# Patient Record
Sex: MALE | Race: WHITE | Employment: FULL TIME | URBAN - METROPOLITAN AREA
[De-identification: names, ages, dates, MRNs, and addresses within clinical notes are randomized per-mention and may not be internally consistent; named-entity substitution may affect disease eponyms.]

---

## 2017-03-23 ENCOUNTER — OFFICE VISIT (OUTPATIENT)
Dept: OTHER | Facility: HOSPITAL | Age: 57
End: 2017-03-23
Attending: PREVENTIVE MEDICINE

## 2017-03-23 ENCOUNTER — OFFICE VISIT (OUTPATIENT)
Dept: OTHER | Facility: HOSPITAL | Age: 57
End: 2017-03-23

## 2017-03-23 ENCOUNTER — HOSPITAL ENCOUNTER (OUTPATIENT)
Dept: CV DIAGNOSTICS | Facility: HOSPITAL | Age: 57
Discharge: HOME | End: 2017-03-23
Attending: PREVENTIVE MEDICINE

## 2017-03-23 DIAGNOSIS — Z00.00 PHYSICAL EXAM, ANNUAL: ICD-10-CM

## 2017-03-23 DIAGNOSIS — Z00.00 PHYSICAL EXAM, ANNUAL: Primary | ICD-10-CM

## 2017-03-24 NOTE — H&P
PATIENT'S NAME: Lisette Sloan   ATTENDING PHYSICIAN: Alondra Miles M.D.    PATIENT ACCOUNT #: [de-identified] LOCATION: [address] [place] [address] RECORD #: [id number] YOB: 1960   DATE OF SERVICE: 03/23/2017       EXECUTIVE HISTORY AND Labs show a negative heavy metal screen and elevated lipid panel. Cholesterol is 220 with an HDL of 34 and LDL of 114. Triglycerides were 359. There was some mild liver enzyme elevation with an AST of 93 and ALT of 85.   Alkaline phosphatase was norm

## 2017-06-27 RX ORDER — OLMESARTAN MEDOXOMIL 20 MG/1
TABLET, FILM COATED ORAL
Qty: 90 TABLET | Refills: 3 | Status: SHIPPED | OUTPATIENT
Start: 2017-06-27 | End: 2018-01-02

## 2017-06-27 NOTE — TELEPHONE ENCOUNTER
Hypertension Medications Protocol Failed6/25 1:02 AM   CMP or BMP in past 12 months     Requesting Benicar  LOV: 12/12/16 -acute  10/13/16 - wellness   RTC: 1 year   Last Labs: 2/2016 per media  Filled: 5/13/16 #90 with 3 refills    No future appointments.

## 2017-07-01 ENCOUNTER — MED REC SCAN ONLY (OUTPATIENT)
Dept: FAMILY MEDICINE CLINIC | Facility: CLINIC | Age: 57
End: 2017-07-01

## 2017-11-01 ENCOUNTER — TELEPHONE (OUTPATIENT)
Dept: FAMILY MEDICINE CLINIC | Facility: CLINIC | Age: 57
End: 2017-11-01

## 2017-11-01 DIAGNOSIS — Z12.5 ENCOUNTER FOR SPECIAL SCREENING EXAMINATION FOR NEOPLASM OF PROSTATE: ICD-10-CM

## 2017-11-01 DIAGNOSIS — E78.2 MIXED HYPERLIPIDEMIA: ICD-10-CM

## 2017-11-01 DIAGNOSIS — E88.81 METABOLIC SYNDROME: ICD-10-CM

## 2017-11-01 DIAGNOSIS — I10 ESSENTIAL HYPERTENSION: Primary | ICD-10-CM

## 2017-11-01 DIAGNOSIS — N52.9 ERECTILE DYSFUNCTION, UNSPECIFIED ERECTILE DYSFUNCTION TYPE: ICD-10-CM

## 2017-11-01 NOTE — TELEPHONE ENCOUNTER
Requesting lab order  LOV: 12/12/16  RTC: not noted  Last Labs: 9/2016 and 10/2016    Time started: 1006  Time ended: 1008  Total time spent on chart: 2 min    Future Appointments  Date Time Provider Buster Elmore   12/11/2017 3:00 PM Sabino Jeffers MD

## 2017-12-05 ENCOUNTER — LAB ENCOUNTER (OUTPATIENT)
Dept: LAB | Age: 57
End: 2017-12-05
Attending: INTERNAL MEDICINE
Payer: COMMERCIAL

## 2017-12-05 DIAGNOSIS — E78.2 MIXED HYPERLIPIDEMIA: ICD-10-CM

## 2017-12-05 DIAGNOSIS — Z12.5 ENCOUNTER FOR SPECIAL SCREENING EXAMINATION FOR NEOPLASM OF PROSTATE: ICD-10-CM

## 2017-12-05 DIAGNOSIS — E88.81 METABOLIC SYNDROME: ICD-10-CM

## 2017-12-05 DIAGNOSIS — I10 ESSENTIAL HYPERTENSION: ICD-10-CM

## 2017-12-05 DIAGNOSIS — N52.9 ERECTILE DYSFUNCTION, UNSPECIFIED ERECTILE DYSFUNCTION TYPE: ICD-10-CM

## 2017-12-05 PROCEDURE — 80061 LIPID PANEL: CPT | Performed by: INTERNAL MEDICINE

## 2017-12-05 PROCEDURE — 84153 ASSAY OF PSA TOTAL: CPT | Performed by: INTERNAL MEDICINE

## 2017-12-05 PROCEDURE — 81003 URINALYSIS AUTO W/O SCOPE: CPT | Performed by: INTERNAL MEDICINE

## 2017-12-05 PROCEDURE — 36415 COLL VENOUS BLD VENIPUNCTURE: CPT | Performed by: INTERNAL MEDICINE

## 2017-12-05 PROCEDURE — 84403 ASSAY OF TOTAL TESTOSTERONE: CPT | Performed by: INTERNAL MEDICINE

## 2017-12-05 PROCEDURE — 80050 GENERAL HEALTH PANEL: CPT | Performed by: INTERNAL MEDICINE

## 2017-12-11 ENCOUNTER — OFFICE VISIT (OUTPATIENT)
Dept: FAMILY MEDICINE CLINIC | Facility: CLINIC | Age: 57
End: 2017-12-11

## 2017-12-11 VITALS
BODY MASS INDEX: 33.64 KG/M2 | SYSTOLIC BLOOD PRESSURE: 130 MMHG | TEMPERATURE: 98 F | WEIGHT: 235 LBS | HEART RATE: 68 BPM | HEIGHT: 70 IN | RESPIRATION RATE: 16 BRPM | DIASTOLIC BLOOD PRESSURE: 80 MMHG

## 2017-12-11 DIAGNOSIS — Z00.00 WELLNESS EXAMINATION: Primary | ICD-10-CM

## 2017-12-11 PROCEDURE — 99396 PREV VISIT EST AGE 40-64: CPT | Performed by: FAMILY MEDICINE

## 2017-12-11 RX ORDER — IPRATROPIUM BROMIDE 42 UG/1
2 SPRAY, METERED NASAL 4 TIMES DAILY
Qty: 15 ML | Refills: 0 | Status: SHIPPED | OUTPATIENT
Start: 2017-12-11 | End: 2018-01-02

## 2017-12-11 NOTE — H&P
Wellness Exam    REASON FOR VISIT:    Samira Burroughs is a 56year old male who presents for an [address]     Current Complaints: none  Flu Shot: see immunization record  Health Maintenance Topics with due status: Overdue       Topic Date Due Recommendations Vary with Risk Recommendation Internal Lab or Procedure External Lab or Procedure   Cholesterol Screening Recommended screening varies with age, risk and gender LDL CHOLESTROL (mg/dL)   Date Value   07/17/2014 128     LDL Cholesterol (mg/dL COLONOSCOPY  No date: OTHER SURGICAL HISTORY      Comment: Tip left hand middle finger.  Amputation of                middle finger , With Neurectomy   Family History   Problem Relation Age of Onset   • Cerebral Artery Occlusion With Cerebral Infaction [Oth sounds normal. He has no wheezes. He has no rales. Abdominal: Soft. Bowel sounds are normal. There is no tenderness. Musculoskeletal: Normal range of motion. He exhibits no edema. Lymphadenopathy:     He has no cervical adenopathy.    Neurological: He once then every 10 years   HPV Males 11-21   Zoster (Shingles) 60 and older: one dose   Varicella 2 doses if not immune   MMR 1-2 doses if born after 1954 and not immune     Patient Active Problem List:     Hyperlipidemia     Essential hypertension     Met

## 2017-12-11 NOTE — PATIENT INSTRUCTIONS
Thank you for choosing Schuyler De La Garza MD at [place] [address]  To Do: Tiny Re Rose  1. Please see age appropriate health prevention below    Effective 6/19/17 until November 2017  Due to Koehler Rigobertoid is being moved.   It is inside the [facility] • Please call our office about any questions regarding your treatment/medicines/tests as a result of today's visit.  For your safety, read the entire package insert of all medicines prescribed to you and be aware of all of the risks of treatment even beyon Screening tests and vaccines are an important part of managing your health. Health counseling is essential, too. Below are guidelines for these, for men ages 48 to 59. Talk with your healthcare provider to make sure you’re up-to-date on what you need.   Scr Prostate cancer Starting at age 39, talk to healthcare provider about risks and benefits of digital rectal exam (VI) and prostate-specific antigen (PSA) screening1 At routine exams   Syphilis Men at increased risk for infection – talk with your healthcare pertussis (Td/Tdap) booster All men in this age group Td every 10 years, or a one-time dose of Tdap instead of a Td booster after age 25, then Td every 8 years   Zoster All men ages 61 and older 1 dose   Counseling Who needs it How often   Diet and exerci

## 2017-12-26 ENCOUNTER — TELEPHONE (OUTPATIENT)
Dept: FAMILY MEDICINE CLINIC | Facility: CLINIC | Age: 57
End: 2017-12-26

## 2017-12-26 NOTE — TELEPHONE ENCOUNTER
Pt takes benicar 20mg and needs a exemption form from [facility] filled out from us.  He cannot take generic benicar due to allergic reaction to the red dye in the generic

## 2017-12-28 NOTE — TELEPHONE ENCOUNTER
PA has been sent through [facility] for name brand Benicar 20mg.   Waiting on denial/approval

## 2017-12-29 ENCOUNTER — TELEPHONE (OUTPATIENT)
Dept: FAMILY MEDICINE CLINIC | Facility: CLINIC | Age: 57
End: 2017-12-29

## 2017-12-29 NOTE — TELEPHONE ENCOUNTER
Fax# [phone]  Attn: Darian George      Pt called [facility] states they never received the PA

## 2018-01-02 ENCOUNTER — OFFICE VISIT (OUTPATIENT)
Dept: FAMILY MEDICINE CLINIC | Facility: CLINIC | Age: 58
End: 2018-01-02

## 2018-01-02 VITALS
RESPIRATION RATE: 16 BRPM | OXYGEN SATURATION: 97 % | SYSTOLIC BLOOD PRESSURE: 148 MMHG | HEART RATE: 96 BPM | BODY MASS INDEX: 32.53 KG/M2 | HEIGHT: 70 IN | DIASTOLIC BLOOD PRESSURE: 80 MMHG | WEIGHT: 227.25 LBS | TEMPERATURE: 98 F

## 2018-01-02 DIAGNOSIS — J06.9 VIRAL UPPER RESPIRATORY TRACT INFECTION: Primary | ICD-10-CM

## 2018-01-02 DIAGNOSIS — B37.0 ORAL THRUSH: ICD-10-CM

## 2018-01-02 PROCEDURE — 99213 OFFICE O/P EST LOW 20 MIN: CPT | Performed by: FAMILY MEDICINE

## 2018-01-02 RX ORDER — AMOXICILLIN AND CLAVULANATE POTASSIUM 875; 125 MG/1; MG/1
1 TABLET, FILM COATED ORAL 2 TIMES DAILY
Qty: 14 TABLET | Refills: 0 | Status: SHIPPED | OUTPATIENT
Start: 2018-01-02 | End: 2018-01-09

## 2018-01-02 RX ORDER — IPRATROPIUM BROMIDE 42 UG/1
2 SPRAY, METERED NASAL 4 TIMES DAILY
Qty: 15 ML | Refills: 0 | Status: SHIPPED | OUTPATIENT
Start: 2018-01-02 | End: 2019-10-04 | Stop reason: ALTCHOICE

## 2018-01-02 RX ORDER — VALSARTAN 160 MG/1
160 TABLET ORAL DAILY
Qty: 90 TABLET | Refills: 1 | Status: SHIPPED | OUTPATIENT
Start: 2018-01-02 | End: 2018-03-19 | Stop reason: ALTCHOICE

## 2018-01-02 NOTE — PROGRESS NOTES
HPI:   Ciara Dick is a 56year old male that presents for 4 days of nonproductive cough, nasal congestion, right ear pain and hoarse voice. He has been using Atrovent at home which is helping. She has not tried any other over-the-counter meds.   He de Ears: External normal. TMs normal without erythema or effusion   Nose: patent, clear nasal discharge, mild congestion   Throat:  No tonsillar erythema or exudate. Mouth:  +white coating on tongue, No oral lesions or ulcerations, good dentition.   NEC

## 2018-01-02 NOTE — TELEPHONE ENCOUNTER
Called pharmacy and spoke to the pharmacist.  He stated that the valsartan 80mg is pink so has red dye in it however the valsartan 160mg does not. Spoke to Dr. Sherie Brenner, he says it is ok to send valsartan 160mg. Sent new rx to pharmacy.   Patient is aware a

## 2018-01-02 NOTE — TELEPHONE ENCOUNTER
Per protocol, we dont do appeals on PA's. Please advise on what to give patient for his BP. He is allergic to red dye in the generic benicar.   Thanks

## 2018-01-02 NOTE — PATIENT INSTRUCTIONS
Augmentin twice a day for one week only if not improving in 10 days. Follow up as needed. Viral Upper Respiratory Illness (Adult)  You have a viral upper respiratory illness (URI), which is another term for the common cold.  This illness is contagi Follow up with your healthcare provider, or as advised.   When to seek medical advice  Call your healthcare provider right away if any of these occur:  · Cough with lots of colored sputum (mucus)  · Severe headache; face, neck, or ear pain  · Difficulty swa

## 2018-03-19 PROBLEM — E88.81 INSULIN RESISTANCE: Status: ACTIVE | Noted: 2018-03-19

## 2018-03-19 PROBLEM — E88.819 INSULIN RESISTANCE: Status: ACTIVE | Noted: 2018-03-19

## 2018-04-05 ENCOUNTER — OFFICE VISIT (OUTPATIENT)
Dept: OTHER | Facility: HOSPITAL | Age: 58
End: 2018-04-05
Attending: PREVENTIVE MEDICINE

## 2018-12-07 PROCEDURE — 84403 ASSAY OF TOTAL TESTOSTERONE: CPT | Performed by: INTERNAL MEDICINE

## 2018-12-07 PROCEDURE — 84402 ASSAY OF FREE TESTOSTERONE: CPT | Performed by: INTERNAL MEDICINE

## 2018-12-07 PROCEDURE — 81003 URINALYSIS AUTO W/O SCOPE: CPT | Performed by: INTERNAL MEDICINE

## 2018-12-11 PROBLEM — E78.1 HYPERTRIGLYCERIDEMIA: Status: ACTIVE | Noted: 2018-12-11

## 2019-02-22 ENCOUNTER — OFFICE VISIT (OUTPATIENT)
Dept: FAMILY MEDICINE CLINIC | Facility: CLINIC | Age: 59
End: 2019-02-22
Payer: COMMERCIAL

## 2019-02-22 VITALS
SYSTOLIC BLOOD PRESSURE: 124 MMHG | RESPIRATION RATE: 16 BRPM | BODY MASS INDEX: 35.07 KG/M2 | OXYGEN SATURATION: 98 % | TEMPERATURE: 98 F | DIASTOLIC BLOOD PRESSURE: 84 MMHG | WEIGHT: 245 LBS | HEART RATE: 88 BPM | HEIGHT: 70 IN

## 2019-02-22 DIAGNOSIS — J01.10 ACUTE NON-RECURRENT FRONTAL SINUSITIS: Primary | ICD-10-CM

## 2019-02-22 PROCEDURE — 99213 OFFICE O/P EST LOW 20 MIN: CPT | Performed by: NURSE PRACTITIONER

## 2019-02-22 RX ORDER — AMOXICILLIN AND CLAVULANATE POTASSIUM 875; 125 MG/1; MG/1
1 TABLET, FILM COATED ORAL 2 TIMES DAILY
Qty: 20 TABLET | Refills: 0 | Status: SHIPPED | OUTPATIENT
Start: 2019-02-22 | End: 2019-03-04

## 2019-02-22 NOTE — PROGRESS NOTES
CHIEF COMPLAINT:   Patient presents with:  Ear Problem: on Left, coughing, drainage, pressure      HPI:   Josefina Carvajal is a 66year old male who presents for cold symptoms for over  1  weeks.  Symptoms have progressed into sinus congestion and been worsen • Other (Cerebral Artery Occlusion With Cerebral Infaction) Father    • Cancer Mother         Ovarian      Social History    Tobacco Use      Smoking status: Never Smoker      Smokeless tobacco: Never Used    Alcohol use: No    Drug use: No        REVIEW O • Amoxicillin-Pot Clavulanate 875-125 MG Oral Tab 20 tablet 0     Sig: Take 1 tablet by mouth 2 (two) times daily for 10 days. Risks, benefits, side effects of medication addressed and explained.     Patient Instructions     Sinusitis (Antibiotic Racheal · You can use an over-the-counter decongestant, unless a similar medicine was prescribed to you. Nasal sprays work the fastest. Use one that contains phenylephrine or oxymetazoline. First blow your nose gently. Then use the spray.  Do not use these medicine · Don’t have close contact with people who have sore throats, colds, or other upper respiratory infections. · Don’t smoke, and stay away from secondhand smoke. · Stay up to date with of your vaccines.   Date Last Reviewed: 11/1/2017  © 6531-8808 [employer]

## 2019-03-19 ENCOUNTER — HOSPITAL ENCOUNTER (OUTPATIENT)
Dept: GENERAL RADIOLOGY | Facility: HOSPITAL | Age: 59
Discharge: HOME | End: 2019-03-19
Attending: PREVENTIVE MEDICINE

## 2019-03-19 ENCOUNTER — OFFICE VISIT (OUTPATIENT)
Dept: OTHER | Facility: HOSPITAL | Age: 59
End: 2019-03-19
Attending: PREVENTIVE MEDICINE

## 2019-03-19 ENCOUNTER — HOSPITAL ENCOUNTER (OUTPATIENT)
Dept: CV DIAGNOSTICS | Facility: HOSPITAL | Age: 59
Discharge: HOME | End: 2019-03-19
Attending: PREVENTIVE MEDICINE

## 2019-03-19 DIAGNOSIS — Z00.00 ANNUAL PHYSICAL EXAM: ICD-10-CM

## 2019-03-19 DIAGNOSIS — Z00.00 ANNUAL PHYSICAL EXAM: Primary | ICD-10-CM

## 2019-10-16 PROBLEM — J01.00 SUBACUTE MAXILLARY SINUSITIS: Status: ACTIVE | Noted: 2019-10-16

## 2020-01-07 ENCOUNTER — APPOINTMENT (OUTPATIENT)
Dept: OTHER | Facility: HOSPITAL | Age: 60
End: 2020-01-07
Attending: PREVENTIVE MEDICINE

## 2020-02-12 ENCOUNTER — OFFICE VISIT (OUTPATIENT)
Dept: OTHER | Facility: HOSPITAL | Age: 60
End: 2020-02-12
Attending: PREVENTIVE MEDICINE

## 2020-02-12 DIAGNOSIS — Z00.00 ANNUAL PHYSICAL EXAM: Primary | ICD-10-CM

## 2020-02-12 PROCEDURE — 93005 ELECTROCARDIOGRAM TRACING: CPT

## 2020-02-13 LAB
ATRIAL RATE: 61 BPM
P AXIS: 16 DEGREES
P-R INTERVAL: 138 MS
Q-T INTERVAL: 376 MS
QRS DURATION: 98 MS
QTC CALCULATION (BEZET): 378 MS
R AXIS: 56 DEGREES
T AXIS: 41 DEGREES
VENTRICULAR RATE: 61 BPM

## 2020-02-21 ENCOUNTER — OFFICE VISIT (OUTPATIENT)
Dept: FAMILY MEDICINE CLINIC | Facility: CLINIC | Age: 60
End: 2020-02-21
Payer: COMMERCIAL

## 2020-02-21 VITALS
WEIGHT: 249 LBS | RESPIRATION RATE: 22 BRPM | HEART RATE: 76 BPM | SYSTOLIC BLOOD PRESSURE: 144 MMHG | BODY MASS INDEX: 36.46 KG/M2 | DIASTOLIC BLOOD PRESSURE: 88 MMHG | TEMPERATURE: 98 F | HEIGHT: 69.25 IN | OXYGEN SATURATION: 98 %

## 2020-02-21 DIAGNOSIS — J01.10 ACUTE FRONTAL SINUSITIS, RECURRENCE NOT SPECIFIED: Primary | ICD-10-CM

## 2020-02-21 PROCEDURE — 99213 OFFICE O/P EST LOW 20 MIN: CPT | Performed by: PHYSICIAN ASSISTANT

## 2020-02-21 RX ORDER — FLUTICASONE PROPIONATE 50 MCG
2 SPRAY, SUSPENSION (ML) NASAL DAILY
Qty: 1 BOTTLE | Refills: 0 | Status: SHIPPED | OUTPATIENT
Start: 2020-02-21 | End: 2020-03-06

## 2020-02-21 RX ORDER — AMOXICILLIN AND CLAVULANATE POTASSIUM 875; 125 MG/1; MG/1
1 TABLET, FILM COATED ORAL 2 TIMES DAILY
Qty: 20 TABLET | Refills: 0 | Status: SHIPPED | OUTPATIENT
Start: 2020-02-21 | End: 2020-03-02 | Stop reason: ALTCHOICE

## 2020-02-21 NOTE — PROGRESS NOTES
CHIEF COMPLAINT:   Patient presents with:  Nasal Congestion: Right ear pain, post nasal drip, coughing up flem, sinus pressure, wheezing at night, X 1-2 weeks       HPI:   Noel Cabrera. is a 64year old male who presents for cold symptoms for 2 week. Ovarian      Social History    Tobacco Use      Smoking status: Never Smoker      Smokeless tobacco: Never Used    Alcohol use: No    Drug use: No        REVIEW OF SYSTEMS:   GENERAL:  Normal appetite  SKIN: no rashes or abnormal skin lesions  HEENT: See H Prescriptions     Signed Prescriptions Disp Refills   • Amoxicillin-Pot Clavulanate 875-125 MG Oral Tab 20 tablet 0     Sig: Take 1 tablet by mouth 2 (two) times daily for 10 days.    • Fluticasone Propionate 50 MCG/ACT Nasal Suspension 1 Bottle 0     Sig:

## 2020-03-02 ENCOUNTER — OFFICE VISIT (OUTPATIENT)
Dept: FAMILY MEDICINE CLINIC | Facility: CLINIC | Age: 60
End: 2020-03-02
Payer: COMMERCIAL

## 2020-03-02 VITALS
BODY MASS INDEX: 35.73 KG/M2 | WEIGHT: 244 LBS | TEMPERATURE: 98 F | OXYGEN SATURATION: 96 % | SYSTOLIC BLOOD PRESSURE: 132 MMHG | HEART RATE: 76 BPM | RESPIRATION RATE: 16 BRPM | DIASTOLIC BLOOD PRESSURE: 80 MMHG | HEIGHT: 69.25 IN

## 2020-03-02 DIAGNOSIS — J01.00 ACUTE MAXILLARY SINUSITIS, RECURRENCE NOT SPECIFIED: Primary | ICD-10-CM

## 2020-03-02 PROCEDURE — 99213 OFFICE O/P EST LOW 20 MIN: CPT | Performed by: FAMILY MEDICINE

## 2020-03-02 RX ORDER — METHYLPREDNISOLONE 4 MG/1
TABLET ORAL
Qty: 1 KIT | Refills: 0 | Status: SHIPPED | OUTPATIENT
Start: 2020-03-02 | End: 2020-10-29

## 2020-03-02 RX ORDER — DOXYCYCLINE HYCLATE 100 MG
100 TABLET ORAL 2 TIMES DAILY
Qty: 20 TABLET | Refills: 0 | Status: SHIPPED | OUTPATIENT
Start: 2020-03-02 | End: 2020-03-12

## 2020-03-02 NOTE — PROGRESS NOTES
HPI:    Patient ID: Castro Live. is a 64year old male. HPI  Mr. Ameya Serna. is a pleasant 60-year-old gentleman with history of hypertension here today for cough and congestion which started about 2 weeks ago.   He was seen at [facility] and was g No distress. HENT:   Right Ear: External ear normal. No tenderness. Tympanic membrane is bulging. Tympanic membrane is not erythematous. No middle ear effusion. Left Ear: External ear normal. No tenderness. Tympanic membrane is bulging.  Tympanic Eulas Kowalski

## 2020-03-02 NOTE — PATIENT INSTRUCTIONS
Thank you for choosing Solomon Vallejo MD at [place] [address]  To Do: Ingrid Hendrickson Sr.  1. Please take meds as directed. [facility] is located in [address]. Monday, Tuesday & Friday – 8 a.m. to 4 p.m.   Wednesday, Thursday – 7 a.m. to 3 outweigh those potential risks and we strive to make you healthier and to improve your quality of life.     Referrals, and Radiology Information:    If your insurance requires a referral to a specialist, please allow 5 business days to process your referral the nose, sinuses, and throat. It warms and moistens the air you breathe in. It also makes the sticky mucus that helps clean the air of dust and other small particles.  The turbinates on each side of the nose are curved, bony ridges lined with mucous Han Bench

## 2020-03-06 ENCOUNTER — TELEPHONE (OUTPATIENT)
Dept: FAMILY MEDICINE CLINIC | Facility: CLINIC | Age: 60
End: 2020-03-06

## 2020-03-06 RX ORDER — ALBUTEROL SULFATE 90 UG/1
1 AEROSOL, METERED RESPIRATORY (INHALATION) EVERY 6 HOURS PRN
Qty: 1 G | Refills: 1 | Status: SHIPPED | OUTPATIENT
Start: 2020-03-06 | End: 2020-10-29

## 2020-03-06 NOTE — TELEPHONE ENCOUNTER
See attached phone message. APPROVE/DENY SET UP Albuterol Inhaler. 3/2/20 Dr. Najma Dubose Acute max Sinusitis partial notes:  Pulmonary/Chest: Effort normal and breath sounds normal. No respiratory distress. He has no wheezes. He has no rales.      Pt has

## 2020-03-06 NOTE — TELEPHONE ENCOUNTER
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

## 2020-03-20 ENCOUNTER — TELEPHONE (OUTPATIENT)
Dept: FAMILY MEDICINE CLINIC | Facility: CLINIC | Age: 60
End: 2020-03-20

## 2020-03-20 RX ORDER — DOXYCYCLINE HYCLATE 100 MG/1
100 CAPSULE ORAL 2 TIMES DAILY
Qty: 20 CAPSULE | Refills: 0 | Status: SHIPPED | OUTPATIENT
Start: 2020-03-20 | End: 2020-03-30

## 2020-03-20 NOTE — TELEPHONE ENCOUNTER
Patient calling, feeling symptoms are returning, has yellowish phlegm. Patient stated he finished 10 day treatment of antibiotics. Asking if he can get another round of antibiotics, or if he needs OV.

## 2020-10-26 ENCOUNTER — LAB ENCOUNTER (OUTPATIENT)
Dept: LAB | Age: 60
End: 2020-10-26
Attending: INTERNAL MEDICINE
Payer: COMMERCIAL

## 2020-10-26 DIAGNOSIS — Z77.29 TOXIN EXPOSURE: ICD-10-CM

## 2020-10-26 DIAGNOSIS — E78.5 HYPERLIPIDEMIA, UNSPECIFIED HYPERLIPIDEMIA TYPE: ICD-10-CM

## 2020-10-26 DIAGNOSIS — E88.81 INSULIN RESISTANCE: ICD-10-CM

## 2020-10-26 DIAGNOSIS — E88.81 METABOLIC SYNDROME: ICD-10-CM

## 2020-10-26 DIAGNOSIS — I10 ESSENTIAL HYPERTENSION: ICD-10-CM

## 2020-10-26 PROCEDURE — 83655 ASSAY OF LEAD: CPT

## 2020-10-26 PROCEDURE — 80061 LIPID PANEL: CPT

## 2020-10-26 PROCEDURE — 83036 HEMOGLOBIN GLYCOSYLATED A1C: CPT

## 2020-10-26 PROCEDURE — 82300 ASSAY OF CADMIUM: CPT

## 2020-10-26 PROCEDURE — 80053 COMPREHEN METABOLIC PANEL: CPT

## 2020-10-26 PROCEDURE — 82175 ASSAY OF ARSENIC: CPT

## 2020-10-26 PROCEDURE — 36415 COLL VENOUS BLD VENIPUNCTURE: CPT

## 2020-10-26 PROCEDURE — 83525 ASSAY OF INSULIN: CPT

## 2020-10-26 PROCEDURE — 82172 ASSAY OF APOLIPOPROTEIN: CPT

## 2020-10-26 PROCEDURE — 83825 ASSAY OF MERCURY: CPT

## 2020-10-26 PROCEDURE — 82397 CHEMILUMINESCENT ASSAY: CPT

## 2020-10-29 ENCOUNTER — OFFICE VISIT (OUTPATIENT)
Dept: INTERNAL MEDICINE CLINIC | Facility: CLINIC | Age: 60
End: 2020-10-29
Payer: COMMERCIAL

## 2020-10-29 VITALS
BODY MASS INDEX: 35.22 KG/M2 | OXYGEN SATURATION: 98 % | TEMPERATURE: 98 F | RESPIRATION RATE: 18 BRPM | WEIGHT: 246 LBS | SYSTOLIC BLOOD PRESSURE: 120 MMHG | HEART RATE: 70 BPM | DIASTOLIC BLOOD PRESSURE: 82 MMHG | HEIGHT: 70 IN

## 2020-10-29 DIAGNOSIS — Z28.21 INFLUENZA VACCINATION DECLINED BY PATIENT: ICD-10-CM

## 2020-10-29 DIAGNOSIS — Z13.29 THYROID DISORDER SCREEN: ICD-10-CM

## 2020-10-29 DIAGNOSIS — Z13.0 SCREENING, IRON DEFICIENCY ANEMIA: ICD-10-CM

## 2020-10-29 DIAGNOSIS — Z00.00 LABORATORY EXAMINATION ORDERED AS PART OF A ROUTINE GENERAL MEDICAL EXAMINATION: ICD-10-CM

## 2020-10-29 DIAGNOSIS — Z00.00 ANNUAL PHYSICAL EXAM: Primary | ICD-10-CM

## 2020-10-29 DIAGNOSIS — Z13.220 LIPID SCREENING: ICD-10-CM

## 2020-10-29 DIAGNOSIS — Z12.5 PROSTATE CANCER SCREENING: ICD-10-CM

## 2020-10-29 DIAGNOSIS — Z13.89 SCREENING FOR GENITOURINARY CONDITION: ICD-10-CM

## 2020-10-29 PROBLEM — J01.00 SUBACUTE MAXILLARY SINUSITIS: Status: RESOLVED | Noted: 2019-10-16 | Resolved: 2020-10-29

## 2020-10-29 PROBLEM — Z91.89 FRAMINGHAM CARDIAC RISK <10% IN NEXT 10 YEARS: Status: ACTIVE | Noted: 2020-10-29

## 2020-10-29 PROBLEM — E88.819 INSULIN RESISTANCE: Status: RESOLVED | Noted: 2018-03-19 | Resolved: 2020-10-29

## 2020-10-29 PROBLEM — E88.81 INSULIN RESISTANCE: Status: RESOLVED | Noted: 2018-03-19 | Resolved: 2020-10-29

## 2020-10-29 PROBLEM — E78.1 HYPERTRIGLYCERIDEMIA: Status: RESOLVED | Noted: 2018-12-11 | Resolved: 2020-10-29

## 2020-10-29 PROBLEM — M62.08 DIASTASIS RECTI: Status: ACTIVE | Noted: 2020-10-29

## 2020-10-29 PROCEDURE — 3008F BODY MASS INDEX DOCD: CPT | Performed by: INTERNAL MEDICINE

## 2020-10-29 PROCEDURE — 3079F DIAST BP 80-89 MM HG: CPT | Performed by: INTERNAL MEDICINE

## 2020-10-29 PROCEDURE — 3074F SYST BP LT 130 MM HG: CPT | Performed by: INTERNAL MEDICINE

## 2020-10-29 PROCEDURE — 99396 PREV VISIT EST AGE 40-64: CPT | Performed by: INTERNAL MEDICINE

## 2020-10-29 RX ORDER — OLMESARTAN MEDOXOMIL 20 MG/1
20 TABLET, FILM COATED ORAL DAILY
Qty: 90 TABLET | Refills: 3 | Status: SHIPPED | OUTPATIENT
Start: 2020-10-29 | End: 2021-01-21

## 2020-10-29 NOTE — PATIENT INSTRUCTIONS
Prevention Guidelines, Men Ages 48 to 59  Screening tests and vaccines are an important part of managing your health. A screening test is done to find diseases in people who don't have any symptoms.  The goal is to find a disease early so lifestyle change Type 2 diabetes or prediabetes  All men beginning at age 39 and men without symptoms at any age who are overweight or obese and have 1 or more other risk factors for diabetes  At least every 3 years (yearly if your blood sugar has already begun to rise) Ask your healthcare provider if you need glaucoma screening with a dilated eye exam every 2 years(2).     Vaccine Who needs it How often   Chickenpox (varicella)  All men in this age group who have no record of this infection or vaccine  2 doses; second dos PPSV23: 1 to 2 doses through age 59, or 1 dose at 72 or older (protects against 23 types of pneumococcal bacteria)    Tetanus/diphtheria/pertussis (Td/Tdap) booster All men in this age group  Td every 10 years, or a 1-time dose of Tdap instead of a Td klaus © 2980-0269 [employer] [address] [facility], [facility] [place]. All rights reserved. This information is not intended as a substitute for professional medical care. Always follow your healthcare professional's instructions.

## 2020-10-29 NOTE — PROGRESS NOTES
HPI:    Patient ID: Art Godfrey. is a 64year old male.   The 10-year ASCVD risk score (Argenis Haddad, et al., 2013) is: 10.6%    Values used to calculate the score:      Age: 64 years      Sex: Male      Is Non- : No      Diabet Component Value Date    PSA 1.2 02/22/2018        Current Outpatient Medications   Medication Sig Dispense Refill   • BENICAR 20 MG Oral Tab Take 1 tablet (20 mg total) by mouth daily. 90 tablet 3   • Flaxseed, Linseed, (FLAX SEED OIL OR) Take by mouth. 120/82   Pulse 70   Temp 98.1 °F (36.7 °C) (Oral)   Resp 18   Ht 70\"   Wt 246 lb (111.6 kg)   SpO2 98%   BMI 35.30 kg/m²   Body mass index is 35.3 kg/m².    GENERAL: well developed, well nourished,in no apparent distress  SKIN: no rashes,no suspicious lesi GENERIC.   Vasotec [Enalapril]     Coughing   PHYSICAL EXAM:   Physical Exam           ASSESSMENT/PLAN:   Influenza vaccination declined by patient  Annual physical exam  (primary encounter diagnosis)  Lipid screening  Screening for genitourinary condition

## 2020-12-21 ENCOUNTER — MED REC SCAN ONLY (OUTPATIENT)
Dept: INTERNAL MEDICINE CLINIC | Facility: CLINIC | Age: 60
End: 2020-12-21

## 2020-12-21 ENCOUNTER — IMMUNIZATION (OUTPATIENT)
Dept: LAB | Facility: HOSPITAL | Age: 60
End: 2020-12-21
Attending: PREVENTIVE MEDICINE
Payer: COMMERCIAL

## 2020-12-21 DIAGNOSIS — Z23 NEED FOR VACCINATION: ICD-10-CM

## 2020-12-21 PROCEDURE — 0001A PFIZER-BIONTECH COVID-19 VACCINE: CPT

## 2020-12-23 ENCOUNTER — TELEPHONE (OUTPATIENT)
Dept: INTERNAL MEDICINE CLINIC | Facility: CLINIC | Age: 60
End: 2020-12-23

## 2020-12-23 DIAGNOSIS — B37.0 THRUSH: Primary | ICD-10-CM

## 2020-12-23 RX ORDER — FLUCONAZOLE 100 MG/1
TABLET ORAL
Qty: 8 TABLET | Refills: 0 | Status: SHIPPED | OUTPATIENT
Start: 2020-12-23 | End: 2020-12-30

## 2020-12-23 NOTE — TELEPHONE ENCOUNTER
Patient called in stating that he just had his dentist appointment and was advised that he may have a yeast infection due to his tongue being white. Patient was advised to reach out to PCP for antibiotic. Please call back to discuss.

## 2020-12-23 NOTE — TELEPHONE ENCOUNTER
Spoke with pt he reports his mouth is dry and taste is slightly diminished. No pain. Per Teresa Roland PA-C Nystatin Mouth Susp swish and spit 5ml QID x 7 days f/u in office after completion. D/w pt above he expressed understanding.     Rx sent and appt bess

## 2020-12-31 ENCOUNTER — OFFICE VISIT (OUTPATIENT)
Dept: INTERNAL MEDICINE CLINIC | Facility: CLINIC | Age: 60
End: 2020-12-31
Payer: COMMERCIAL

## 2020-12-31 VITALS
OXYGEN SATURATION: 97 % | HEART RATE: 76 BPM | TEMPERATURE: 98 F | RESPIRATION RATE: 16 BRPM | HEIGHT: 70 IN | DIASTOLIC BLOOD PRESSURE: 80 MMHG | SYSTOLIC BLOOD PRESSURE: 136 MMHG | WEIGHT: 256 LBS | BODY MASS INDEX: 36.65 KG/M2

## 2020-12-31 DIAGNOSIS — B37.0 ORAL THRUSH: Primary | ICD-10-CM

## 2020-12-31 PROCEDURE — 3075F SYST BP GE 130 - 139MM HG: CPT | Performed by: NURSE PRACTITIONER

## 2020-12-31 PROCEDURE — 99213 OFFICE O/P EST LOW 20 MIN: CPT | Performed by: NURSE PRACTITIONER

## 2020-12-31 PROCEDURE — 3079F DIAST BP 80-89 MM HG: CPT | Performed by: NURSE PRACTITIONER

## 2020-12-31 PROCEDURE — 3008F BODY MASS INDEX DOCD: CPT | Performed by: NURSE PRACTITIONER

## 2020-12-31 RX ORDER — FLUCONAZOLE 100 MG/1
100 TABLET ORAL DAILY
Qty: 8 TABLET | Refills: 0 | Status: SHIPPED | OUTPATIENT
Start: 2020-12-31 | End: 2021-08-10 | Stop reason: ALTCHOICE

## 2020-12-31 NOTE — PROGRESS NOTES
HPI:    Patient ID: Noel Cabrera. is a 64year old male. Patient presents with:   Other: follow up for thrush, no symptoms, pt didnt know he had thrush when diagnosed       Was seen by dentist diagnosed with thrush, he completed 1 week of nystatin Q Dispense Refill   • fluconazole 100 MG Oral Tab Take 1 tablet (100 mg total) by mouth daily. Take 2 tablets today than 1 tablet daily x 6 days 8 tablet 0   • BENICAR 20 MG Oral Tab Take 1 tablet (20 mg total) by mouth daily.  90 tablet 3   • Flaxseed, Linse FOLATE  No results found for: IRON, IRONTOT  No results found for: B12, VITB12  No results found for: PHOS, PHOSPHORUS  No results found for: MG     PHYSICAL EXAM:   /80   Pulse 76   Temp 97.7 °F (36.5 °C) (Oral)   Resp 16   Ht 5' 10\" (1.778 m)   Wt

## 2021-01-07 ENCOUNTER — OFFICE VISIT (OUTPATIENT)
Dept: INTERNAL MEDICINE CLINIC | Facility: CLINIC | Age: 61
End: 2021-01-07
Payer: COMMERCIAL

## 2021-01-07 VITALS
DIASTOLIC BLOOD PRESSURE: 78 MMHG | OXYGEN SATURATION: 98 % | HEART RATE: 70 BPM | BODY MASS INDEX: 36.65 KG/M2 | HEIGHT: 70 IN | TEMPERATURE: 98 F | WEIGHT: 256 LBS | SYSTOLIC BLOOD PRESSURE: 136 MMHG | RESPIRATION RATE: 16 BRPM

## 2021-01-07 DIAGNOSIS — B37.0 ORAL THRUSH: Primary | ICD-10-CM

## 2021-01-07 PROBLEM — D22.9 ATYPICAL NEVUS: Status: ACTIVE | Noted: 2021-01-07

## 2021-01-07 PROBLEM — D03.61 MELANOMA IN SITU OF RIGHT UPPER EXTREMITY INCLUDING SHOULDER (HCC): Status: ACTIVE | Noted: 2021-01-07

## 2021-01-07 PROCEDURE — 3078F DIAST BP <80 MM HG: CPT | Performed by: NURSE PRACTITIONER

## 2021-01-07 PROCEDURE — 3008F BODY MASS INDEX DOCD: CPT | Performed by: NURSE PRACTITIONER

## 2021-01-07 PROCEDURE — 3075F SYST BP GE 130 - 139MM HG: CPT | Performed by: NURSE PRACTITIONER

## 2021-01-07 PROCEDURE — 99212 OFFICE O/P EST SF 10 MIN: CPT | Performed by: NURSE PRACTITIONER

## 2021-01-07 NOTE — PROGRESS NOTES
HPI:    Patient ID: Florentin Marcum. is a 64year old male. Patient presents with:   Thrush: had oral thrush, never felt symptomatic, pt states he feels the same and is followin the directions reccomended  at last visit      Complete course of diflucan tablet daily x 6 days 8 tablet 0   • BENICAR 20 MG Oral Tab Take 1 tablet (20 mg total) by mouth daily. 90 tablet 3   • Flaxseed, Linseed, (FLAX SEED OIL OR) Take by mouth. • Aspirin 81 MG Oral Tab Take 81 mg by mouth daily.      • Multiple Vitamins-Min well-developed and well-nourished. HENT:   Brigidoola Polio is resolved   Cardiovascular: Normal rate, regular rhythm and normal heart sounds. Pulmonary/Chest: Effort normal and breath sounds normal. No respiratory distress. Skin: Skin is warm and dry.    Psyc

## 2021-01-11 ENCOUNTER — IMMUNIZATION (OUTPATIENT)
Dept: LAB | Facility: HOSPITAL | Age: 61
End: 2021-01-11
Attending: PREVENTIVE MEDICINE
Payer: COMMERCIAL

## 2021-01-11 DIAGNOSIS — Z23 NEED FOR VACCINATION: ICD-10-CM

## 2021-01-11 PROCEDURE — 0002A SARSCOV2 VAC 30MCG/0.3ML IM: CPT

## 2021-02-12 ENCOUNTER — OFFICE VISIT (OUTPATIENT)
Dept: OTHER | Facility: HOSPITAL | Age: 61
End: 2021-02-12
Attending: PREVENTIVE MEDICINE

## 2021-02-12 DIAGNOSIS — Z00.00 ANNUAL PHYSICAL EXAM: Primary | ICD-10-CM

## 2021-02-12 PROCEDURE — 83655 ASSAY OF LEAD: CPT

## 2021-02-12 PROCEDURE — 82175 ASSAY OF ARSENIC: CPT

## 2021-02-12 PROCEDURE — 82300 ASSAY OF CADMIUM: CPT

## 2021-02-12 PROCEDURE — 83825 ASSAY OF MERCURY: CPT

## 2021-02-16 LAB
ARSENIC, BLOOD: <10 UG/L
CADMIUM, BLOOD: <1 UG/L
LEAD, BLOOD (VENOUS): <2 UG/DL
MERCURY, BLOOD: <2.5 UG/L

## 2021-02-22 ENCOUNTER — TELEPHONE (OUTPATIENT)
Dept: INTERNAL MEDICINE CLINIC | Facility: CLINIC | Age: 61
End: 2021-02-22

## 2021-02-22 DIAGNOSIS — I10 ESSENTIAL HYPERTENSION: Primary | ICD-10-CM

## 2021-02-22 DIAGNOSIS — E78.1 HYPERTRIGLYCERIDEMIA: ICD-10-CM

## 2021-02-22 PROBLEM — Z53.20 STATIN DECLINED: Status: ACTIVE | Noted: 2021-02-22

## 2021-02-22 PROBLEM — Z91.89 FRAMINGHAM CARDIAC RISK 10-20% IN NEXT 10 YEARS: Status: ACTIVE | Noted: 2020-10-29

## 2021-02-22 PROBLEM — Z91.89 FRAMINGHAM CARDIAC RISK <10% IN NEXT 10 YEARS: Status: RESOLVED | Noted: 2020-10-29 | Resolved: 2021-02-22

## 2021-02-22 NOTE — TELEPHONE ENCOUNTER
Per Dr. Génesis Jackson:    No Stress test - due to normal stress test  Patient declined statin at this time (Update in chart)  Reassess risk stratify in 6 months - 14.2 % ASCVD Risk Score   Schedule patient for follow up in 6 months   Does not need to follow up with

## 2021-05-10 DIAGNOSIS — I10 ESSENTIAL HYPERTENSION: Primary | ICD-10-CM

## 2021-05-10 RX ORDER — OLMESARTAN MEDOXOMIL 20 MG/1
20 TABLET, FILM COATED ORAL DAILY
Qty: 90 TABLET | Refills: 0 | Status: SHIPPED | OUTPATIENT
Start: 2021-05-10 | End: 2022-01-24

## 2021-05-10 NOTE — TELEPHONE ENCOUNTER
Patient called in stating that he needs refill of :  BENICAR 20 MG Oral Tab 90 tablet       To be sent to:  [facility] [address], [phone], [phone]    Patient

## 2021-05-10 NOTE — TELEPHONE ENCOUNTER
Spoke with pt he has an allergy to the red dye in lisinopril and enalapril therefore needs brand Jarad. Per Rolando Perez @ [facility] on rx that our office sends needs to have a comment of DAWGucci. Pt informed our office will send over rx with comment.

## 2021-06-07 ENCOUNTER — TELEPHONE (OUTPATIENT)
Dept: INTERNAL MEDICINE CLINIC | Facility: CLINIC | Age: 61
End: 2021-06-07

## 2021-06-07 DIAGNOSIS — R79.89 LOW TESTOSTERONE: ICD-10-CM

## 2021-06-07 DIAGNOSIS — E88.81 METABOLIC SYNDROME: Primary | ICD-10-CM

## 2021-06-07 NOTE — TELEPHONE ENCOUNTER
Patient called and asked for an order for testosterone with [facility] lab. Please call him if it is not required.

## 2021-08-04 ENCOUNTER — LAB ENCOUNTER (OUTPATIENT)
Dept: LAB | Age: 61
End: 2021-08-04
Attending: INTERNAL MEDICINE
Payer: COMMERCIAL

## 2021-08-04 DIAGNOSIS — I10 ESSENTIAL HYPERTENSION: ICD-10-CM

## 2021-08-04 DIAGNOSIS — R79.89 LOW TESTOSTERONE: ICD-10-CM

## 2021-08-04 DIAGNOSIS — E78.1 HYPERTRIGLYCERIDEMIA: ICD-10-CM

## 2021-08-04 LAB
ALBUMIN SERPL-MCNC: 3.8 G/DL (ref 3.4–5)
ALBUMIN/GLOB SERPL: 1.1 {RATIO} (ref 1–2)
ALP LIVER SERPL-CCNC: 83 U/L
ALT SERPL-CCNC: 68 U/L
ANION GAP SERPL CALC-SCNC: 3 MMOL/L (ref 0–18)
AST SERPL-CCNC: 33 U/L (ref 15–37)
BILIRUB SERPL-MCNC: 0.6 MG/DL (ref 0.1–2)
BILIRUB UR QL STRIP.AUTO: NEGATIVE
BUN BLD-MCNC: 18 MG/DL (ref 7–18)
CALCIUM BLD-MCNC: 8.8 MG/DL (ref 8.5–10.1)
CHLORIDE SERPL-SCNC: 108 MMOL/L (ref 98–112)
CHOLEST SMN-MCNC: 173 MG/DL (ref ?–200)
CLARITY UR REFRACT.AUTO: CLEAR
CO2 SERPL-SCNC: 29 MMOL/L (ref 21–32)
COLOR UR AUTO: YELLOW
CREAT BLD-MCNC: 1.16 MG/DL
GLOBULIN PLAS-MCNC: 3.6 G/DL (ref 2.8–4.4)
GLUCOSE BLD-MCNC: 82 MG/DL (ref 70–99)
GLUCOSE UR STRIP.AUTO-MCNC: NEGATIVE MG/DL
HDLC SERPL-MCNC: 30 MG/DL (ref 40–59)
KETONES UR STRIP.AUTO-MCNC: NEGATIVE MG/DL
LDLC SERPL CALC-MCNC: 112 MG/DL (ref ?–100)
LEUKOCYTE ESTERASE UR QL STRIP.AUTO: NEGATIVE
M PROTEIN MFR SERPL ELPH: 7.4 G/DL (ref 6.4–8.2)
NITRITE UR QL STRIP.AUTO: NEGATIVE
NONHDLC SERPL-MCNC: 143 MG/DL (ref ?–130)
OSMOLALITY SERPL CALC.SUM OF ELEC: 291 MOSM/KG (ref 275–295)
PATIENT FASTING Y/N/NP: YES
PATIENT FASTING Y/N/NP: YES
PH UR STRIP.AUTO: 5 [PH] (ref 5–8)
POTASSIUM SERPL-SCNC: 4.4 MMOL/L (ref 3.5–5.1)
PROT UR STRIP.AUTO-MCNC: NEGATIVE MG/DL
RBC UR QL AUTO: NEGATIVE
SODIUM SERPL-SCNC: 140 MMOL/L (ref 136–145)
SP GR UR STRIP.AUTO: 1.01 (ref 1–1.03)
TRIGL SERPL-MCNC: 173 MG/DL (ref 30–149)
UROBILINOGEN UR STRIP.AUTO-MCNC: <2 MG/DL
VLDLC SERPL CALC-MCNC: 30 MG/DL (ref 0–30)

## 2021-08-04 PROCEDURE — 84402 ASSAY OF FREE TESTOSTERONE: CPT

## 2021-08-04 PROCEDURE — 80061 LIPID PANEL: CPT

## 2021-08-04 PROCEDURE — 36415 COLL VENOUS BLD VENIPUNCTURE: CPT

## 2021-08-04 PROCEDURE — 81003 URINALYSIS AUTO W/O SCOPE: CPT

## 2021-08-04 PROCEDURE — 80053 COMPREHEN METABOLIC PANEL: CPT

## 2021-08-04 PROCEDURE — 84403 ASSAY OF TOTAL TESTOSTERONE: CPT

## 2021-08-09 LAB
TESTOSTERONE TOTAL: 360.4 NG/DL
TESTOSTERONE, FREE -MS/MS: 55.9 PG/ML

## 2021-08-10 ENCOUNTER — OFFICE VISIT (OUTPATIENT)
Dept: INTERNAL MEDICINE CLINIC | Facility: CLINIC | Age: 61
End: 2021-08-10
Payer: COMMERCIAL

## 2021-08-10 VITALS
DIASTOLIC BLOOD PRESSURE: 84 MMHG | RESPIRATION RATE: 16 BRPM | HEIGHT: 70 IN | OXYGEN SATURATION: 99 % | TEMPERATURE: 97 F | SYSTOLIC BLOOD PRESSURE: 136 MMHG | WEIGHT: 242 LBS | HEART RATE: 72 BPM | BODY MASS INDEX: 34.65 KG/M2

## 2021-08-10 DIAGNOSIS — I10 ESSENTIAL HYPERTENSION: Primary | ICD-10-CM

## 2021-08-10 DIAGNOSIS — R79.89 ELEVATED LFTS: ICD-10-CM

## 2021-08-10 DIAGNOSIS — Z12.11 COLON CANCER SCREENING: ICD-10-CM

## 2021-08-10 PROBLEM — D03.61 MELANOMA IN SITU OF RIGHT UPPER EXTREMITY INCLUDING SHOULDER (HCC): Status: RESOLVED | Noted: 2021-01-07 | Resolved: 2021-08-10

## 2021-08-10 PROBLEM — Z85.820 HISTORY OF MELANOMA: Status: ACTIVE | Noted: 2021-08-10

## 2021-08-10 PROBLEM — D22.9 ATYPICAL NEVUS: Status: RESOLVED | Noted: 2021-01-07 | Resolved: 2021-08-10

## 2021-08-10 PROCEDURE — 3008F BODY MASS INDEX DOCD: CPT | Performed by: INTERNAL MEDICINE

## 2021-08-10 PROCEDURE — 3075F SYST BP GE 130 - 139MM HG: CPT | Performed by: INTERNAL MEDICINE

## 2021-08-10 PROCEDURE — 99213 OFFICE O/P EST LOW 20 MIN: CPT | Performed by: INTERNAL MEDICINE

## 2021-08-10 PROCEDURE — 3079F DIAST BP 80-89 MM HG: CPT | Performed by: INTERNAL MEDICINE

## 2021-08-10 NOTE — PROGRESS NOTES
HPI/Subjective:   Patient ID: Kimberley Solitario is a 64year old male.   The 10-year ASCVD risk score (Lexi Cosme, et al., 2013) is: 12.1%    Values used to calculate the score:      Age: 64 years      Sex: Male      Is Non- : No by mouth. • Aspirin 81 MG Oral Tab Take 81 mg by mouth daily. • Multiple Vitamins-Minerals (MULTIVITAL OR) Take  by mouth.         Past Medical History:   Diagnosis Date   • Acute meniscal tear of left knee 10/31/2016    9.2016   • Hyperlipidemia 6/ in 5 m   The patient indicates understanding of these issues and agrees to the plan. The patient is asked to return in 5 m .     History/Other:   Review of Systems  Current Outpatient Medications   Medication Sig Dispense Refill   • BENICAR 20 MG Oral Tab

## 2021-10-04 ENCOUNTER — TELEMEDICINE (OUTPATIENT)
Dept: INTERNAL MEDICINE CLINIC | Facility: CLINIC | Age: 61
End: 2021-10-04
Payer: COMMERCIAL

## 2021-10-04 DIAGNOSIS — B96.89 ACUTE BACTERIAL RHINOSINUSITIS: Primary | ICD-10-CM

## 2021-10-04 DIAGNOSIS — J01.90 ACUTE BACTERIAL RHINOSINUSITIS: Primary | ICD-10-CM

## 2021-10-04 PROCEDURE — 99213 OFFICE O/P EST LOW 20 MIN: CPT | Performed by: NURSE PRACTITIONER

## 2021-10-04 RX ORDER — PREDNISONE 20 MG/1
40 TABLET ORAL DAILY
Qty: 14 TABLET | Refills: 0 | Status: SHIPPED | OUTPATIENT
Start: 2021-10-04 | End: 2021-10-11

## 2021-10-04 RX ORDER — AMOXICILLIN AND CLAVULANATE POTASSIUM 875; 125 MG/1; MG/1
1 TABLET, FILM COATED ORAL 2 TIMES DAILY
Qty: 20 TABLET | Refills: 0 | Status: SHIPPED | OUTPATIENT
Start: 2021-10-04 | End: 2021-10-14

## 2021-10-04 NOTE — PROGRESS NOTES
HPI:   This visit is conducted using Telemedicine with live, interactive video and audio. Patient presents with symptoms of right ear pain, sinus congestion and pressure, PND for 5 days.  No COVID contacts and he has been vaccinated   Current treatment i distress  - able to speak in full sentences  - alert and oriented     ASSESSMENT AND PLAN:   Acute bacterial rhinosinusitis  (primary encounter diagnosis)     Requested Prescriptions     Signed Prescriptions Disp Refills   • amoxicillin clavulanate [phone]

## 2021-10-29 ENCOUNTER — OFFICE VISIT (OUTPATIENT)
Dept: SURGERY | Facility: CLINIC | Age: 61
End: 2021-10-29
Payer: COMMERCIAL

## 2021-10-29 DIAGNOSIS — N13.8 BPH WITH OBSTRUCTION/LOWER URINARY TRACT SYMPTOMS: ICD-10-CM

## 2021-10-29 DIAGNOSIS — R82.90 URINE FINDING: Primary | ICD-10-CM

## 2021-10-29 DIAGNOSIS — N40.1 BPH WITH OBSTRUCTION/LOWER URINARY TRACT SYMPTOMS: ICD-10-CM

## 2021-10-29 PROCEDURE — 81003 URINALYSIS AUTO W/O SCOPE: CPT | Performed by: UROLOGY

## 2021-10-29 PROCEDURE — 99243 OFF/OP CNSLTJ NEW/EST LOW 30: CPT | Performed by: UROLOGY

## 2021-10-29 PROCEDURE — 51798 US URINE CAPACITY MEASURE: CPT | Performed by: UROLOGY

## 2021-10-29 RX ORDER — SILDENAFIL 100 MG/1
100 TABLET, FILM COATED ORAL
Qty: 30 TABLET | Refills: 5 | Status: SHIPPED | OUTPATIENT
Start: 2021-10-29 | End: 2021-10-29 | Stop reason: CLARIF

## 2021-10-29 RX ORDER — TADALAFIL 5 MG/1
5 TABLET ORAL
Qty: 30 TABLET | Refills: 11 | Status: SHIPPED | OUTPATIENT
Start: 2021-10-29 | End: 2021-10-29 | Stop reason: CLARIF

## 2021-10-29 RX ORDER — TADALAFIL 5 MG/1
TABLET ORAL
Qty: 30 TABLET | Refills: 11 | Status: SHIPPED | OUTPATIENT
Start: 2021-10-29

## 2021-10-29 NOTE — PROGRESS NOTES
Rooming Clinician:     Syed Peoples is a 63year old male.   Patient presents with:  Consult: c/o he has dribble after peeing , erectile dysfuntion  Erectile Dysfunction:  Frequency of erections: Abnormal      HPI:     Patient comes to the office for eval Aspirin 81 MG Oral Tab Take 81 mg by mouth daily. • Multiple Vitamins-Minerals (MULTIVITAL OR) Take  by mouth.          Red Dye                 HIVES    Comment:CAN'T TAKE THE GENERIC BENICAR BECAUSE ALLERGIC TO             THE RED DYE# 5 THAT'S IN THE normal  EXTREMITIES: no cyanosis, clubbing or edema    LAB:     Lab Results   Component Value Date    WBC 7.2 01/19/2021    HGB 16.0 01/19/2021    HCT 48.0 01/19/2021    .0 01/19/2021    CREATSERUM 1.16 08/04/2021    BUN 18 08/04/2021     08/0

## 2021-10-29 NOTE — PATIENT INSTRUCTIONS
On behalf of myself and care team, I would like to thank you for entrusting us with your care today. It is our goal to provide you and your family with excellent care.   Please note that you may receive a survey in the mail; any feedback you have for this about all the medicines you take. Please tell all your doctors and dentists that you are on this medicine before they provide care.   Do not start or stop any other medicines without first speaking to your doctor or pharmacist.  If you have had a heart att your medicine. Please talk to them if you have any questions. Always follow their advice. There is a more complete description of this medicine available in [place]. Scan this code on your smartphone or tablet or use the web address below.  You can also ask y

## 2021-12-01 ENCOUNTER — OFFICE VISIT (OUTPATIENT)
Dept: INTERNAL MEDICINE CLINIC | Facility: CLINIC | Age: 61
End: 2021-12-01
Payer: COMMERCIAL

## 2021-12-01 VITALS
HEART RATE: 88 BPM | RESPIRATION RATE: 16 BRPM | SYSTOLIC BLOOD PRESSURE: 138 MMHG | TEMPERATURE: 99 F | WEIGHT: 247 LBS | DIASTOLIC BLOOD PRESSURE: 82 MMHG | BODY MASS INDEX: 35.36 KG/M2 | HEIGHT: 70 IN | OXYGEN SATURATION: 98 %

## 2021-12-01 DIAGNOSIS — I10 ESSENTIAL HYPERTENSION: ICD-10-CM

## 2021-12-01 DIAGNOSIS — J18.9 COMMUNITY ACQUIRED PNEUMONIA, UNSPECIFIED LATERALITY: ICD-10-CM

## 2021-12-01 DIAGNOSIS — R05.9 COUGH: Primary | ICD-10-CM

## 2021-12-01 PROCEDURE — 3075F SYST BP GE 130 - 139MM HG: CPT | Performed by: PHYSICIAN ASSISTANT

## 2021-12-01 PROCEDURE — 99214 OFFICE O/P EST MOD 30 MIN: CPT | Performed by: PHYSICIAN ASSISTANT

## 2021-12-01 PROCEDURE — 3008F BODY MASS INDEX DOCD: CPT | Performed by: PHYSICIAN ASSISTANT

## 2021-12-01 PROCEDURE — 3079F DIAST BP 80-89 MM HG: CPT | Performed by: PHYSICIAN ASSISTANT

## 2021-12-01 RX ORDER — AMOXICILLIN AND CLAVULANATE POTASSIUM 875; 125 MG/1; MG/1
1 TABLET, FILM COATED ORAL 2 TIMES DAILY
Qty: 20 TABLET | Refills: 0 | Status: SHIPPED | OUTPATIENT
Start: 2021-12-01 | End: 2021-12-03

## 2021-12-01 RX ORDER — AZITHROMYCIN 250 MG/1
TABLET, FILM COATED ORAL
Qty: 6 TABLET | Refills: 0 | Status: SHIPPED | OUTPATIENT
Start: 2021-12-01 | End: 2021-12-03

## 2021-12-01 NOTE — PROGRESS NOTES
HPI:   Abe Corona is a 63year old male who presents for upper respiratory symptoms for  7  days. Patient reports congestion, ear pain, sinus pressure, cough, chest tightness and thicker sputum production .  Patient denies chills, sweats, body aches, lo discharge  HEENT: congested; no difficulty swallowing or discharge from ears  LUNGS: denies shortness of breath, hemoptysis  CARDIOVASCULAR: denies chest pain, palpitations or edema  GI: no nausea, vomiting or diarrhea  NEURO: denies dizziness, weakness or

## 2021-12-01 NOTE — PATIENT INSTRUCTIONS
Take antibiotic as directed until completely finished. Contact us if you experience any adverse reaction to the medication.    Schedule COVID test  Continue inhaler 1-2 times daily

## 2021-12-02 ENCOUNTER — NURSE ONLY (OUTPATIENT)
Dept: LAB | Age: 61
End: 2021-12-02
Attending: PHYSICIAN ASSISTANT
Payer: COMMERCIAL

## 2021-12-02 ENCOUNTER — TELEPHONE (OUTPATIENT)
Dept: INTERNAL MEDICINE CLINIC | Facility: CLINIC | Age: 61
End: 2021-12-02

## 2021-12-02 DIAGNOSIS — R05.9 COUGH: ICD-10-CM

## 2021-12-02 NOTE — TELEPHONE ENCOUNTER
Pt said his grandson has RSV they were just advised last night. Pt was getting his covid test taken at [facility] this morning and the person taking the sample asked pt to contact us to have the RSV Test added to order.    The same sample will be used f

## 2021-12-03 PROBLEM — Z86.16 PERSONAL HISTORY OF COVID-19: Status: ACTIVE | Noted: 2021-12-03

## 2021-12-04 ENCOUNTER — HOSPITAL ENCOUNTER (EMERGENCY)
Age: 61
Discharge: HOME | End: 2021-12-04
Attending: EMERGENCY MEDICINE
Payer: COMMERCIAL

## 2021-12-04 ENCOUNTER — APPOINTMENT (OUTPATIENT)
Dept: GENERAL RADIOLOGY | Age: 61
End: 2021-12-04
Attending: EMERGENCY MEDICINE
Payer: COMMERCIAL

## 2021-12-04 VITALS
HEIGHT: 70 IN | TEMPERATURE: 99 F | DIASTOLIC BLOOD PRESSURE: 77 MMHG | SYSTOLIC BLOOD PRESSURE: 117 MMHG | RESPIRATION RATE: 18 BRPM | OXYGEN SATURATION: 94 % | WEIGHT: 235 LBS | BODY MASS INDEX: 33.64 KG/M2 | HEART RATE: 94 BPM

## 2021-12-04 DIAGNOSIS — U07.1 COVID-19: Primary | ICD-10-CM

## 2021-12-04 PROCEDURE — 71045 X-RAY EXAM CHEST 1 VIEW: CPT | Performed by: EMERGENCY MEDICINE

## 2021-12-04 PROCEDURE — 99284 EMERGENCY DEPT VISIT MOD MDM: CPT

## 2021-12-04 NOTE — ED PROVIDER NOTES
Patient Seen in: THE [facility] [place]      History   Patient presents with:   Other    Stated Complaint: requesting polyclonals due to testing positive for covid 12/2    Subjective:   HPI    Patient is a 53-year-old male comes to [place] 177.8 cm (5' 10\")   Wt 106.6 kg   SpO2 94%   BMI 33.72 kg/m²         Physical Exam    GENERAL: No acute distress, well appearing and non-toxic, Alert and oriented X 3   HEENT: Normocephalic, atraumatic. Moist mucous membranes.   Pupils equal round reactiv benefit sheet critical care time was  35 minutes.  This critical care time is exclusive of procedures critical care time includes monitoring of patient's cardiopulmonary and hemodynamic status, interpretation of laboratory values, and discussion of case wit

## 2021-12-06 ENCOUNTER — TELEPHONE (OUTPATIENT)
Dept: CASE MANAGEMENT | Age: 61
End: 2021-12-06

## 2021-12-06 NOTE — TELEPHONE ENCOUNTER
Pt received PAB infusion at  ED  on 12/4/21 for COVID-19. Please follow-up with pt for post-infusion assessment and home monitoring if needed. Thank you.

## 2021-12-10 ENCOUNTER — TELEPHONE (OUTPATIENT)
Dept: INTERNAL MEDICINE CLINIC | Facility: CLINIC | Age: 61
End: 2021-12-10

## 2021-12-10 RX ORDER — ALBUTEROL SULFATE 90 UG/1
2 AEROSOL, METERED RESPIRATORY (INHALATION) EVERY 6 HOURS PRN
Qty: 6.7 G | Refills: 0 | Status: SHIPPED | OUTPATIENT
Start: 2021-12-10 | End: 2022-01-02

## 2021-12-10 NOTE — TELEPHONE ENCOUNTER
Spoke with pt he has covid and only remaining symptoms is dry cough that is spasmatic in nature. He is Using cough drops. Per Dr. Ifeanyi Limtil 2 puffs every 6 hours PRN. D/w pt above he expressed understanding. Rx sent.

## 2021-12-20 ENCOUNTER — TELEPHONE (OUTPATIENT)
Dept: INTERNAL MEDICINE CLINIC | Facility: CLINIC | Age: 61
End: 2021-12-20

## 2021-12-20 NOTE — TELEPHONE ENCOUNTER
Patient was sick since Nov 29th. Pneumonia and Bronchitis took all the medications and is getting better but still has   Cough and yellow colored phlem, left ear draining. No shortness of breath or fever.     Pt is requesting additional medication t

## 2021-12-20 NOTE — TELEPHONE ENCOUNTER
Per Dr. Dorina Blacwkell Prednisone 40mg daily x 5 days, continue albuterol and OTC sudafed no more than 3 days and monitor blood pressure. LMOVM TCOB. Per Dr. Dorina Blackwell Prednisone 40mg x 5 days and sudafed.       Spoke with pt he would also like an antibiotic as h

## 2021-12-21 ENCOUNTER — OFFICE VISIT (OUTPATIENT)
Dept: INTERNAL MEDICINE CLINIC | Facility: CLINIC | Age: 61
End: 2021-12-21
Payer: COMMERCIAL

## 2021-12-21 VITALS
HEART RATE: 85 BPM | HEIGHT: 70 IN | WEIGHT: 247 LBS | RESPIRATION RATE: 16 BRPM | BODY MASS INDEX: 35.36 KG/M2 | SYSTOLIC BLOOD PRESSURE: 138 MMHG | OXYGEN SATURATION: 99 % | TEMPERATURE: 98 F | DIASTOLIC BLOOD PRESSURE: 82 MMHG

## 2021-12-21 DIAGNOSIS — Z12.5 PROSTATE CANCER SCREENING: ICD-10-CM

## 2021-12-21 DIAGNOSIS — Z00.00 ANNUAL PHYSICAL EXAM: Primary | ICD-10-CM

## 2021-12-21 DIAGNOSIS — Z00.00 LABORATORY EXAMINATION ORDERED AS PART OF A ROUTINE GENERAL MEDICAL EXAMINATION: ICD-10-CM

## 2021-12-21 DIAGNOSIS — Z12.11 COLON CANCER SCREENING: ICD-10-CM

## 2021-12-21 DIAGNOSIS — Z13.89 SCREENING FOR GENITOURINARY CONDITION: ICD-10-CM

## 2021-12-21 DIAGNOSIS — J01.00 ACUTE NON-RECURRENT MAXILLARY SINUSITIS: ICD-10-CM

## 2021-12-21 DIAGNOSIS — Z13.0 SCREENING, IRON DEFICIENCY ANEMIA: ICD-10-CM

## 2021-12-21 DIAGNOSIS — Z13.220 LIPID SCREENING: ICD-10-CM

## 2021-12-21 DIAGNOSIS — Z13.29 THYROID DISORDER SCREEN: ICD-10-CM

## 2021-12-21 PROCEDURE — 99213 OFFICE O/P EST LOW 20 MIN: CPT | Performed by: INTERNAL MEDICINE

## 2021-12-21 PROCEDURE — 99396 PREV VISIT EST AGE 40-64: CPT | Performed by: INTERNAL MEDICINE

## 2021-12-21 PROCEDURE — 3079F DIAST BP 80-89 MM HG: CPT | Performed by: INTERNAL MEDICINE

## 2021-12-21 PROCEDURE — 3075F SYST BP GE 130 - 139MM HG: CPT | Performed by: INTERNAL MEDICINE

## 2021-12-21 PROCEDURE — 3008F BODY MASS INDEX DOCD: CPT | Performed by: INTERNAL MEDICINE

## 2021-12-21 RX ORDER — AMOXICILLIN AND CLAVULANATE POTASSIUM 875; 125 MG/1; MG/1
1 TABLET, FILM COATED ORAL 2 TIMES DAILY
Qty: 20 TABLET | Refills: 0 | Status: SHIPPED | OUTPATIENT
Start: 2021-12-21 | End: 2021-12-30

## 2021-12-21 RX ORDER — PREDNISONE 20 MG/1
TABLET ORAL
Qty: 10 TABLET | Refills: 0 | Status: SHIPPED | OUTPATIENT
Start: 2021-12-21 | End: 2021-12-30

## 2021-12-22 DIAGNOSIS — N40.0 BENIGN PROSTATIC HYPERPLASIA WITHOUT LOWER URINARY TRACT SYMPTOMS: ICD-10-CM

## 2021-12-22 RX ORDER — TAMSULOSIN HYDROCHLORIDE 0.4 MG/1
CAPSULE ORAL
Qty: 90 CAPSULE | Refills: 0 | Status: SHIPPED | OUTPATIENT
Start: 2021-12-22 | End: 2021-12-30

## 2021-12-22 NOTE — PROGRESS NOTES
Subjective:   Patient ID: Demetrius Evans is a 63year old male. Sinus Problem      Demetrius Evans is a 63year old male who presents for a complete physical exam.   HPI:   Pt complains of sinus pain x 2 weeks.  Occurred after recent COVID infection/PNA t mouth 2 (two) times daily for 10 days.  20 tablet 0   • predniSONE 20 MG Oral Tab Take 2 tablets by mouth daily x 5 days 10 tablet 0   • albuterol (PROVENTIL HFA) 108 (90 Base) MCG/ACT Inhalation Aero Soln Inhale 2 puffs into the lungs every 6 (six) hours a pain,denies heartburn  : denies nocturia or changes in stream  MUSCULOSKELETAL: denies back pain  NEURO: denies headaches  PSYCHE: denies depression or anxiety  HEMATOLOGIC: denies hx of anemia  ENDOCRINE: denies thyroid history  ALL/ASTHMA: denies hx of HFA) 108 (90 Base) MCG/ACT Inhalation Aero Soln Inhale 2 puffs into the lungs every 6 (six) hours as needed (cough).  6.7 g 0   • tadalafil 5 MG Oral Tab 1 tablet daily for symptoms of an enlarged prostate with lower urinary tract symptoms 30 tablet 11   •

## 2021-12-27 ENCOUNTER — TELEPHONE (OUTPATIENT)
Dept: INTERNAL MEDICINE CLINIC | Facility: CLINIC | Age: 61
End: 2021-12-27

## 2021-12-27 DIAGNOSIS — Z77.29 TOXIN EXPOSURE: Primary | ICD-10-CM

## 2021-12-30 ENCOUNTER — TELEPHONE (OUTPATIENT)
Dept: INTERNAL MEDICINE CLINIC | Facility: CLINIC | Age: 61
End: 2021-12-30

## 2021-12-30 ENCOUNTER — LAB ENCOUNTER (OUTPATIENT)
Dept: LAB | Age: 61
End: 2021-12-30
Attending: INTERNAL MEDICINE
Payer: COMMERCIAL

## 2021-12-30 DIAGNOSIS — Z13.0 SCREENING, IRON DEFICIENCY ANEMIA: ICD-10-CM

## 2021-12-30 DIAGNOSIS — Z13.29 THYROID DISORDER SCREEN: ICD-10-CM

## 2021-12-30 DIAGNOSIS — Z91.89 RISK FOR CORONARY ARTERY DISEASE GREATER THAN 10% IN NEXT 10 YEARS: Primary | ICD-10-CM

## 2021-12-30 DIAGNOSIS — Z13.89 SCREENING FOR GENITOURINARY CONDITION: ICD-10-CM

## 2021-12-30 DIAGNOSIS — Z12.5 PROSTATE CANCER SCREENING: ICD-10-CM

## 2021-12-30 DIAGNOSIS — Z00.00 LABORATORY EXAMINATION ORDERED AS PART OF A ROUTINE GENERAL MEDICAL EXAMINATION: ICD-10-CM

## 2021-12-30 DIAGNOSIS — Z13.220 LIPID SCREENING: ICD-10-CM

## 2021-12-30 DIAGNOSIS — Z77.29 TOXIN EXPOSURE: ICD-10-CM

## 2021-12-30 LAB
ALBUMIN SERPL-MCNC: 3.6 G/DL (ref 3.4–5)
ALBUMIN/GLOB SERPL: 1 {RATIO} (ref 1–2)
ALP LIVER SERPL-CCNC: 80 U/L
ALT SERPL-CCNC: 53 U/L
ANION GAP SERPL CALC-SCNC: 7 MMOL/L (ref 0–18)
AST SERPL-CCNC: 25 U/L (ref 15–37)
BASOPHILS # BLD AUTO: 0.03 X10(3) UL (ref 0–0.2)
BASOPHILS NFR BLD AUTO: 0.5 %
BILIRUB SERPL-MCNC: 0.8 MG/DL (ref 0.1–2)
BILIRUB UR QL STRIP.AUTO: NEGATIVE
BUN BLD-MCNC: 20 MG/DL (ref 7–18)
CALCIUM BLD-MCNC: 8.8 MG/DL (ref 8.5–10.1)
CHLORIDE SERPL-SCNC: 107 MMOL/L (ref 98–112)
CHOLEST SERPL-MCNC: 204 MG/DL (ref ?–200)
CLARITY UR REFRACT.AUTO: CLEAR
CO2 SERPL-SCNC: 25 MMOL/L (ref 21–32)
COLOR UR AUTO: YELLOW
COMPLEXED PSA SERPL-MCNC: 2.15 NG/ML (ref ?–4)
CREAT BLD-MCNC: 1.04 MG/DL
EOSINOPHIL # BLD AUTO: 0.29 X10(3) UL (ref 0–0.7)
EOSINOPHIL NFR BLD AUTO: 4.8 %
ERYTHROCYTE [DISTWIDTH] IN BLOOD BY AUTOMATED COUNT: 14.4 %
EST. AVERAGE GLUCOSE BLD GHB EST-MCNC: 114 MG/DL (ref 68–126)
FASTING PATIENT LIPID ANSWER: YES
FASTING STATUS PATIENT QL REPORTED: YES
GLOBULIN PLAS-MCNC: 3.5 G/DL (ref 2.8–4.4)
GLUCOSE BLD-MCNC: 94 MG/DL (ref 70–99)
GLUCOSE UR STRIP.AUTO-MCNC: NEGATIVE MG/DL
HBA1C MFR BLD: 5.6 % (ref ?–5.7)
HCT VFR BLD AUTO: 48.2 %
HDLC SERPL-MCNC: 32 MG/DL (ref 40–59)
HGB BLD-MCNC: 15.4 G/DL
IMM GRANULOCYTES # BLD AUTO: 0.05 X10(3) UL (ref 0–1)
IMM GRANULOCYTES NFR BLD: 0.8 %
KETONES UR STRIP.AUTO-MCNC: NEGATIVE MG/DL
LDLC SERPL CALC-MCNC: 125 MG/DL (ref ?–100)
LEUKOCYTE ESTERASE UR QL STRIP.AUTO: NEGATIVE
LYMPHOCYTES # BLD AUTO: 1.79 X10(3) UL (ref 1–4)
LYMPHOCYTES NFR BLD AUTO: 29.4 %
MCH RBC QN AUTO: 28.4 PG (ref 26–34)
MCHC RBC AUTO-ENTMCNC: 32 G/DL (ref 31–37)
MCV RBC AUTO: 88.9 FL
MONOCYTES # BLD AUTO: 0.68 X10(3) UL (ref 0.1–1)
MONOCYTES NFR BLD AUTO: 11.2 %
NEUTROPHILS # BLD AUTO: 3.25 X10 (3) UL (ref 1.5–7.7)
NEUTROPHILS # BLD AUTO: 3.25 X10(3) UL (ref 1.5–7.7)
NEUTROPHILS NFR BLD AUTO: 53.3 %
NITRITE UR QL STRIP.AUTO: NEGATIVE
NONHDLC SERPL-MCNC: 172 MG/DL (ref ?–130)
OSMOLALITY SERPL CALC.SUM OF ELEC: 290 MOSM/KG (ref 275–295)
PH UR STRIP.AUTO: 5 [PH] (ref 5–8)
PLATELET # BLD AUTO: 197 10(3)UL (ref 150–450)
POTASSIUM SERPL-SCNC: 4.4 MMOL/L (ref 3.5–5.1)
PROT SERPL-MCNC: 7.1 G/DL (ref 6.4–8.2)
PROT UR STRIP.AUTO-MCNC: NEGATIVE MG/DL
RBC # BLD AUTO: 5.42 X10(6)UL
RBC UR QL AUTO: NEGATIVE
SODIUM SERPL-SCNC: 139 MMOL/L (ref 136–145)
SP GR UR STRIP.AUTO: 1.02 (ref 1–1.03)
TRIGL SERPL-MCNC: 267 MG/DL (ref 30–149)
TSI SER-ACNC: 2.55 MIU/ML (ref 0.36–3.74)
UROBILINOGEN UR STRIP.AUTO-MCNC: <2 MG/DL
VLDLC SERPL CALC-MCNC: 48 MG/DL (ref 0–30)
WBC # BLD AUTO: 6.1 X10(3) UL (ref 4–11)

## 2021-12-30 PROCEDURE — 80061 LIPID PANEL: CPT | Performed by: INTERNAL MEDICINE

## 2021-12-30 PROCEDURE — 84153 ASSAY OF PSA TOTAL: CPT | Performed by: INTERNAL MEDICINE

## 2021-12-30 PROCEDURE — 80050 GENERAL HEALTH PANEL: CPT | Performed by: INTERNAL MEDICINE

## 2021-12-30 PROCEDURE — 83036 HEMOGLOBIN GLYCOSYLATED A1C: CPT | Performed by: INTERNAL MEDICINE

## 2021-12-30 PROCEDURE — 81003 URINALYSIS AUTO W/O SCOPE: CPT | Performed by: INTERNAL MEDICINE

## 2021-12-30 RX ORDER — ROSUVASTATIN CALCIUM 10 MG/1
10 TABLET, COATED ORAL NIGHTLY
Qty: 30 TABLET | Refills: 0 | Status: CANCELLED | OUTPATIENT
Start: 2021-12-30

## 2021-12-30 NOTE — TELEPHONE ENCOUNTER
----- Message from Kendal Unger MD sent at 12/30/2021  4:14 PM CST -----  No dm2  Renal/lft are normal  Thyroid functions are normal  UA is bland  flp with elevated t chol, trigs. His HDL low.  his 10 year cad risk is 13.3% and since it is > 10 % he would

## 2022-01-02 RX ORDER — ALBUTEROL SULFATE 90 UG/1
AEROSOL, METERED RESPIRATORY (INHALATION)
Qty: 8.5 EACH | Refills: 0 | Status: SHIPPED | OUTPATIENT
Start: 2022-01-02 | End: 2022-01-21

## 2022-01-08 NOTE — TELEPHONE ENCOUNTER
[facility] pharmacy called and stated there is an allery on file for   nystatin [id number] UNIT/ML Mouth/Throat Suspension 145 mL     . Please call in an alternative. The allergy is red dye. Implemented All Universal Safety Interventions:  [place] to call system. Call bell, personal items and telephone within reach. Instruct patient to call for assistance. Room bathroom lighting operational. Non-slip footwear when patient is off stretcher. Physically safe environment: no spills, clutter or unnecessary equipment. Stretcher in lowest position, wheels locked, appropriate side rails in place.

## 2022-01-11 ENCOUNTER — LAB ENCOUNTER (OUTPATIENT)
Dept: LAB | Age: 62
End: 2022-01-11
Attending: PREVENTIVE MEDICINE
Payer: COMMERCIAL

## 2022-01-11 DIAGNOSIS — Z00.00 ROUTINE GENERAL MEDICAL EXAMINATION AT A HEALTH CARE FACILITY: Primary | ICD-10-CM

## 2022-01-11 PROCEDURE — 83825 ASSAY OF MERCURY: CPT

## 2022-01-11 PROCEDURE — 83655 ASSAY OF LEAD: CPT

## 2022-01-11 PROCEDURE — 36415 COLL VENOUS BLD VENIPUNCTURE: CPT

## 2022-01-11 PROCEDURE — 82300 ASSAY OF CADMIUM: CPT

## 2022-01-11 PROCEDURE — 82175 ASSAY OF ARSENIC: CPT

## 2022-01-21 RX ORDER — ALBUTEROL SULFATE 90 UG/1
AEROSOL, METERED RESPIRATORY (INHALATION)
Qty: 8.5 EACH | Refills: 1 | Status: SHIPPED | OUTPATIENT
Start: 2022-01-21

## 2022-01-24 ENCOUNTER — TELEPHONE (OUTPATIENT)
Dept: INTERNAL MEDICINE CLINIC | Facility: CLINIC | Age: 62
End: 2022-01-24

## 2022-01-24 DIAGNOSIS — I10 ESSENTIAL HYPERTENSION: ICD-10-CM

## 2022-01-24 RX ORDER — OLMESARTAN MEDOXOMIL 20 MG/1
TABLET, FILM COATED ORAL
Qty: 90 TABLET | Refills: 3 | Status: SHIPPED | OUTPATIENT
Start: 2022-01-24

## 2022-01-24 NOTE — TELEPHONE ENCOUNTER
Req for [facility]: [phone]  Fx: [phone]    BENICAR 20 MG Oral Tab    Fax in triage

## 2022-01-27 RX ORDER — VALSARTAN 160 MG/1
160 TABLET ORAL DAILY
Qty: 90 TABLET | Refills: 1 | Status: CANCELLED | OUTPATIENT
Start: 2022-01-27

## 2022-01-29 ENCOUNTER — LAB ENCOUNTER (OUTPATIENT)
Dept: LAB | Age: 62
End: 2022-01-29
Attending: INTERNAL MEDICINE
Payer: COMMERCIAL

## 2022-01-29 DIAGNOSIS — Z01.818 PRE-OP TESTING: ICD-10-CM

## 2022-01-30 LAB — SARS-COV-2 RNA RESP QL NAA+PROBE: NOT DETECTED

## 2022-02-01 PROBLEM — D12.3 BENIGN NEOPLASM OF TRANSVERSE COLON: Status: ACTIVE | Noted: 2022-02-01

## 2022-02-01 PROBLEM — Z86.010 HISTORY OF ADENOMATOUS POLYP OF COLON: Status: ACTIVE | Noted: 2022-02-01

## 2022-02-01 PROBLEM — D12.4 BENIGN NEOPLASM OF DESCENDING COLON: Status: ACTIVE | Noted: 2022-02-01

## 2022-02-01 PROBLEM — Z86.0101 HISTORY OF ADENOMATOUS POLYP OF COLON: Status: ACTIVE | Noted: 2022-02-01

## 2022-04-11 ENCOUNTER — OFFICE VISIT (OUTPATIENT)
Dept: INTERNAL MEDICINE CLINIC | Facility: CLINIC | Age: 62
End: 2022-04-11
Payer: COMMERCIAL

## 2022-04-11 ENCOUNTER — LAB ENCOUNTER (OUTPATIENT)
Dept: LAB | Age: 62
End: 2022-04-11
Attending: INTERNAL MEDICINE
Payer: COMMERCIAL

## 2022-04-11 VITALS
DIASTOLIC BLOOD PRESSURE: 92 MMHG | WEIGHT: 248 LBS | TEMPERATURE: 98 F | HEIGHT: 70 IN | SYSTOLIC BLOOD PRESSURE: 146 MMHG | RESPIRATION RATE: 16 BRPM | HEART RATE: 68 BPM | BODY MASS INDEX: 35.5 KG/M2

## 2022-04-11 DIAGNOSIS — Z01.818 PREOP EXAM FOR INTERNAL MEDICINE: Primary | ICD-10-CM

## 2022-04-11 DIAGNOSIS — I10 ESSENTIAL HYPERTENSION: ICD-10-CM

## 2022-04-11 DIAGNOSIS — Z01.818 PREOP EXAM FOR INTERNAL MEDICINE: ICD-10-CM

## 2022-04-11 DIAGNOSIS — Z91.89 FRAMINGHAM CARDIAC RISK 10-20% IN NEXT 10 YEARS: ICD-10-CM

## 2022-04-11 PROBLEM — D12.3 BENIGN NEOPLASM OF TRANSVERSE COLON: Status: RESOLVED | Noted: 2022-02-01 | Resolved: 2022-04-11

## 2022-04-11 PROBLEM — M62.08 DIASTASIS RECTI: Status: RESOLVED | Noted: 2020-10-29 | Resolved: 2022-04-11

## 2022-04-11 PROBLEM — D12.4 BENIGN NEOPLASM OF DESCENDING COLON: Status: RESOLVED | Noted: 2022-02-01 | Resolved: 2022-04-11

## 2022-04-11 LAB
ALBUMIN SERPL-MCNC: 4.3 G/DL (ref 3.4–5)
ALBUMIN/GLOB SERPL: 1.3 {RATIO} (ref 1–2)
ALP LIVER SERPL-CCNC: 90 U/L
ALT SERPL-CCNC: 53 U/L
ANION GAP SERPL CALC-SCNC: 4 MMOL/L (ref 0–18)
APTT PPP: 41.1 SECONDS (ref 23.3–35.6)
AST SERPL-CCNC: 33 U/L (ref 15–37)
BASOPHILS # BLD AUTO: 0.04 X10(3) UL (ref 0–0.2)
BASOPHILS NFR BLD AUTO: 0.6 %
BILIRUB SERPL-MCNC: 0.7 MG/DL (ref 0.1–2)
BILIRUB UR QL STRIP.AUTO: NEGATIVE
BUN BLD-MCNC: 17 MG/DL (ref 7–18)
CALCIUM BLD-MCNC: 9 MG/DL (ref 8.5–10.1)
CHLORIDE SERPL-SCNC: 105 MMOL/L (ref 98–112)
CLARITY UR REFRACT.AUTO: CLEAR
CO2 SERPL-SCNC: 30 MMOL/L (ref 21–32)
CREAT BLD-MCNC: 1.01 MG/DL
EOSINOPHIL # BLD AUTO: 0.17 X10(3) UL (ref 0–0.7)
EOSINOPHIL NFR BLD AUTO: 2.4 %
ERYTHROCYTE [DISTWIDTH] IN BLOOD BY AUTOMATED COUNT: 13.5 %
FASTING STATUS PATIENT QL REPORTED: YES
GLOBULIN PLAS-MCNC: 3.3 G/DL (ref 2.8–4.4)
GLUCOSE BLD-MCNC: 81 MG/DL (ref 70–99)
GLUCOSE UR STRIP.AUTO-MCNC: NEGATIVE MG/DL
HCT VFR BLD AUTO: 48.2 %
HGB BLD-MCNC: 15.4 G/DL
IMM GRANULOCYTES # BLD AUTO: 0.02 X10(3) UL (ref 0–1)
IMM GRANULOCYTES NFR BLD: 0.3 %
INR BLD: 1.05 (ref 0.8–1.2)
KETONES UR STRIP.AUTO-MCNC: NEGATIVE MG/DL
LEUKOCYTE ESTERASE UR QL STRIP.AUTO: NEGATIVE
LYMPHOCYTES # BLD AUTO: 1.81 X10(3) UL (ref 1–4)
LYMPHOCYTES NFR BLD AUTO: 25.8 %
MCH RBC QN AUTO: 28.1 PG (ref 26–34)
MCHC RBC AUTO-ENTMCNC: 32 G/DL (ref 31–37)
MCV RBC AUTO: 88 FL
MONOCYTES # BLD AUTO: 0.71 X10(3) UL (ref 0.1–1)
MONOCYTES NFR BLD AUTO: 10.1 %
NEUTROPHILS # BLD AUTO: 4.27 X10 (3) UL (ref 1.5–7.7)
NEUTROPHILS # BLD AUTO: 4.27 X10(3) UL (ref 1.5–7.7)
NEUTROPHILS NFR BLD AUTO: 60.8 %
NITRITE UR QL STRIP.AUTO: NEGATIVE
OSMOLALITY SERPL CALC.SUM OF ELEC: 289 MOSM/KG (ref 275–295)
PH UR STRIP.AUTO: 7 [PH] (ref 5–8)
PLATELET # BLD AUTO: 207 10(3)UL (ref 150–450)
POTASSIUM SERPL-SCNC: 4.3 MMOL/L (ref 3.5–5.1)
PROT SERPL-MCNC: 7.6 G/DL (ref 6.4–8.2)
PROT UR STRIP.AUTO-MCNC: NEGATIVE MG/DL
PROTHROMBIN TIME: 13.8 SECONDS (ref 11.6–14.8)
RBC # BLD AUTO: 5.48 X10(6)UL
RBC UR QL AUTO: NEGATIVE
SODIUM SERPL-SCNC: 139 MMOL/L (ref 136–145)
SP GR UR STRIP.AUTO: 1 (ref 1–1.03)
UROBILINOGEN UR STRIP.AUTO-MCNC: <2 MG/DL
WBC # BLD AUTO: 7 X10(3) UL (ref 4–11)

## 2022-04-11 PROCEDURE — 3080F DIAST BP >= 90 MM HG: CPT | Performed by: NURSE PRACTITIONER

## 2022-04-11 PROCEDURE — 85730 THROMBOPLASTIN TIME PARTIAL: CPT

## 2022-04-11 PROCEDURE — 85610 PROTHROMBIN TIME: CPT

## 2022-04-11 PROCEDURE — 80053 COMPREHEN METABOLIC PANEL: CPT

## 2022-04-11 PROCEDURE — 36415 COLL VENOUS BLD VENIPUNCTURE: CPT

## 2022-04-11 PROCEDURE — 99214 OFFICE O/P EST MOD 30 MIN: CPT | Performed by: NURSE PRACTITIONER

## 2022-04-11 PROCEDURE — 3077F SYST BP >= 140 MM HG: CPT | Performed by: NURSE PRACTITIONER

## 2022-04-11 PROCEDURE — 87081 CULTURE SCREEN ONLY: CPT

## 2022-04-11 PROCEDURE — 93000 ELECTROCARDIOGRAM COMPLETE: CPT | Performed by: NURSE PRACTITIONER

## 2022-04-11 PROCEDURE — 85025 COMPLETE CBC W/AUTO DIFF WBC: CPT

## 2022-04-11 PROCEDURE — 3008F BODY MASS INDEX DOCD: CPT | Performed by: NURSE PRACTITIONER

## 2022-04-11 PROCEDURE — 81003 URINALYSIS AUTO W/O SCOPE: CPT

## 2022-04-11 RX ORDER — VALACYCLOVIR HYDROCHLORIDE 1 G/1
2000 TABLET, FILM COATED ORAL EVERY 12 HOURS PRN
COMMUNITY
Start: 2022-03-16

## 2022-04-12 ENCOUNTER — TELEPHONE (OUTPATIENT)
Dept: INTERNAL MEDICINE CLINIC | Facility: CLINIC | Age: 62
End: 2022-04-12

## 2022-04-12 NOTE — TELEPHONE ENCOUNTER
Patient called in returning a call from Clinical staff of [facility] wants to discuss recent blood work. Please give patient a call.

## 2022-04-13 NOTE — PROGRESS NOTES
Awaiting for sress test to be completed to fax pre-op H and P and labs to [facility]  Pt has stress test scheduled on 4/15

## 2022-04-15 ENCOUNTER — HOSPITAL ENCOUNTER (OUTPATIENT)
Dept: CV DIAGNOSTICS | Facility: HOSPITAL | Age: 62
Discharge: HOME | End: 2022-04-15
Attending: NURSE PRACTITIONER
Payer: COMMERCIAL

## 2022-04-15 DIAGNOSIS — I10 ESSENTIAL HYPERTENSION: ICD-10-CM

## 2022-04-15 DIAGNOSIS — Z91.89 FRAMINGHAM CARDIAC RISK 10-20% IN NEXT 10 YEARS: ICD-10-CM

## 2022-04-15 DIAGNOSIS — Z01.818 PREOP EXAM FOR INTERNAL MEDICINE: ICD-10-CM

## 2022-04-15 PROCEDURE — 78452 HT MUSCLE IMAGE SPECT MULT: CPT | Performed by: NURSE PRACTITIONER

## 2022-04-15 PROCEDURE — 93017 CV STRESS TEST TRACING ONLY: CPT | Performed by: NURSE PRACTITIONER

## 2022-05-25 ENCOUNTER — MED REC SCAN ONLY (OUTPATIENT)
Dept: INTERNAL MEDICINE CLINIC | Facility: CLINIC | Age: 62
End: 2022-05-25

## 2022-06-03 NOTE — TELEPHONE ENCOUNTER
PA has been denied as patient has to try and fail irbersatan, olmersatan and valsartan. Jie Hoffman from the [address] at [phone], states an appeal can be sent to [phone]. Arava Counseling:  Patient counseled regarding adverse effects of Arava including but not limited to nausea, vomiting, abnormalities in liver function tests. Patients may develop mouth sores, rash, diarrhea, and abnormalities in blood counts. The patient understands that monitoring is required including LFTs and blood counts.  There is a rare possibility of scarring of the liver and lung problems that can occur when taking methotrexate. Persistent nausea, loss of appetite, pale stools, dark urine, cough, and shortness of breath should be reported immediately. Patient advised to discontinue Arava treatment and consult with a physician prior to attempting conception. The patient will have to undergo a treatment to eliminate Arava from the body prior to conception.

## 2022-06-23 ENCOUNTER — MED REC SCAN ONLY (OUTPATIENT)
Dept: INTERNAL MEDICINE CLINIC | Facility: CLINIC | Age: 62
End: 2022-06-23

## 2022-08-09 ENCOUNTER — OFFICE VISIT (OUTPATIENT)
Dept: SURGERY | Facility: CLINIC | Age: 62
End: 2022-08-09
Payer: COMMERCIAL

## 2022-08-09 DIAGNOSIS — R82.90 URINE FINDING: Primary | ICD-10-CM

## 2022-08-09 LAB
APPEARANCE: CLEAR
BILIRUBIN: NEGATIVE
GLUCOSE (URINE DIPSTICK): NEGATIVE MG/DL
KETONES (URINE DIPSTICK): NEGATIVE MG/DL
LEUKOCYTES: NEGATIVE
MULTISTIX LOT#: ABNORMAL NUMERIC
NITRITE, URINE: NEGATIVE
PH, URINE: 6 (ref 4.5–8)
PROTEIN (URINE DIPSTICK): NEGATIVE MG/DL
SPECIFIC GRAVITY: 1.02 (ref 1–1.03)
URINE-COLOR: YELLOW
UROBILINOGEN,SEMI-QN: 0.2 MG/DL (ref 0–1.9)

## 2022-08-09 PROCEDURE — 99213 OFFICE O/P EST LOW 20 MIN: CPT | Performed by: UROLOGY

## 2022-08-09 PROCEDURE — 81003 URINALYSIS AUTO W/O SCOPE: CPT | Performed by: UROLOGY

## 2022-08-09 RX ORDER — TADALAFIL 20 MG/1
20 TABLET ORAL
Qty: 10 TABLET | Refills: 5 | Status: SHIPPED | OUTPATIENT
Start: 2022-08-09

## 2022-10-27 ENCOUNTER — PATIENT MESSAGE (OUTPATIENT)
Dept: INTERNAL MEDICINE CLINIC | Facility: CLINIC | Age: 62
End: 2022-10-27

## 2022-10-27 DIAGNOSIS — Z00.00 LABORATORY EXAMINATION ORDERED AS PART OF A ROUTINE GENERAL MEDICAL EXAMINATION: Primary | ICD-10-CM

## 2022-10-27 NOTE — TELEPHONE ENCOUNTER
From: Manisha Rose  To: Danish Marsh MD  Sent: 10/27/2022 3:45 PM CDT  Subject: Annual physical w/complete bloodwork panel as usual.    Hi, Dr Nelson Proctor. In December I will need you to fill out the annual necessity [address] form in order to get my Benicar Brand name approved through [facility]. Bobby June just had [facility] send the same request for her since we both are retired, and she is on my insurance and prescription plan. Did you want me to have my blood work completed and physical before the end of the year or in January? Thank you for your time.  Johan Marion

## 2022-10-28 NOTE — TELEPHONE ENCOUNTER
From: Cooper Rose  Sent: 10/27/2022 5:05 PM CDT  To: [facility] Clinical Staff  Subject: Annual physical w/complete bloodwork panel as usual.    Just set up for 12/30/22 annual physical appointment. Can you set up my complete blood panel workup with the same tests as prior years before that date.  Thank you, Todd Armijo

## 2022-11-07 ENCOUNTER — TELEPHONE (OUTPATIENT)
Dept: INTERNAL MEDICINE CLINIC | Facility: CLINIC | Age: 62
End: 2022-11-07

## 2022-11-07 NOTE — TELEPHONE ENCOUNTER
Pt is calling to ask us to contact [facility] for a specific form for a medication he needs. [facility] [employer]   Ph: [phone]   BENICAR 20 MG Oral Tab  req brand name penalty exemption form   Due to allergic reaction from die of generic option     Pt needs this done in a week or 2 or he will be out of medication.

## 2022-11-10 ENCOUNTER — PATIENT MESSAGE (OUTPATIENT)
Dept: INTERNAL MEDICINE CLINIC | Facility: CLINIC | Age: 62
End: 2022-11-10

## 2022-11-11 ENCOUNTER — PATIENT MESSAGE (OUTPATIENT)
Dept: INTERNAL MEDICINE CLINIC | Facility: CLINIC | Age: 62
End: 2022-11-11

## 2022-11-11 NOTE — TELEPHONE ENCOUNTER
From: Gloria Rose  Sent: 11/11/2022 10:56 AM CST  To: [facility] Clinical Staff  Subject: Benicar forms requested from [facility] [employer], thank you for staying on top of this, we greatly appreciate it! I hope my forms don't cause you as much trouble. I think this is BS that [facility] makes us complete these forms every year. Have a great day and hang in there! You got this!  Thanks again, Stan Mo

## 2022-11-11 NOTE — TELEPHONE ENCOUNTER
From: Curt Rose  To: Cynthia Dawkins MD  Sent: 11/10/2022 7:03 PM CST  Subject: Benicar forms requested from [employer]    I called and spoke with Patrice Taylor on Monday to give this message to Kanchan Luevano about having to contact [facility] at [phone] and request them to send you (2) \"brand name drug penalty exemption forms\". One for myself and one for my wife Gilma Enrique. Both need to be filled out and sent back to them to approve and have on file annually. Along with a medical necessity form for each stating that we tried the generic's and didn't work, or we had reactions to. Please call me at [phone] with any questions and more information.  Thank you, Cody Nieto

## 2022-11-16 ENCOUNTER — PATIENT MESSAGE (OUTPATIENT)
Dept: INTERNAL MEDICINE CLINIC | Facility: CLINIC | Age: 62
End: 2022-11-16

## 2022-11-16 DIAGNOSIS — I10 ESSENTIAL HYPERTENSION: ICD-10-CM

## 2022-11-16 RX ORDER — OLMESARTAN MEDOXOMIL 20 MG/1
20 TABLET ORAL DAILY
Qty: 90 TABLET | Refills: 3 | Status: SHIPPED | OUTPATIENT
Start: 2022-11-16

## 2022-11-16 NOTE — TELEPHONE ENCOUNTER
Called [facility] jayne, Stated Benicar is approved  Pt notified  Rx sent to pharmacy

## 2022-11-16 NOTE — TELEPHONE ENCOUNTER
From: Curt Rose  Sent: 11/16/2022 3:55 PM CST  To: [facility] Clinical Staff  Subject: Benicar forms requested from [facility]    Thank you, Lara! They also confirmed receiving my wife's forms from you? Sorry, for this being such a pain.  Cody Nieto

## 2022-11-16 NOTE — TELEPHONE ENCOUNTER
From: Darcie Del Castillo Omegaor  Sent: 11/16/2022 1:52 PM CST  To: [facility] Clinical Staff  [facility] Just following up on [facility] faxing you the exemption form and necessity form for me. I only have enough pills through Friday. If you need me to call them just let me know. Can you also confirm with them that they received Sofie's forms. She tried going to the [facility] earlier today and they stated [facility] stated that they are waiting for her forms to be faxed back from your office. We are sorry for all the extra work on this. Have a great afternoon and safe ride home tonight!  Lorenda Dose

## 2022-12-07 ENCOUNTER — OFFICE VISIT (OUTPATIENT)
Dept: INTERNAL MEDICINE CLINIC | Facility: CLINIC | Age: 62
End: 2022-12-07
Payer: COMMERCIAL

## 2022-12-07 VITALS
HEART RATE: 78 BPM | DIASTOLIC BLOOD PRESSURE: 88 MMHG | RESPIRATION RATE: 16 BRPM | BODY MASS INDEX: 35.5 KG/M2 | WEIGHT: 248 LBS | OXYGEN SATURATION: 99 % | HEIGHT: 70 IN | SYSTOLIC BLOOD PRESSURE: 136 MMHG | TEMPERATURE: 99 F

## 2022-12-07 DIAGNOSIS — J20.9 ACUTE BRONCHITIS, UNSPECIFIED ORGANISM: ICD-10-CM

## 2022-12-07 DIAGNOSIS — J01.00 ACUTE NON-RECURRENT MAXILLARY SINUSITIS: Primary | ICD-10-CM

## 2022-12-07 PROCEDURE — 3075F SYST BP GE 130 - 139MM HG: CPT | Performed by: PHYSICIAN ASSISTANT

## 2022-12-07 PROCEDURE — 99213 OFFICE O/P EST LOW 20 MIN: CPT | Performed by: PHYSICIAN ASSISTANT

## 2022-12-07 PROCEDURE — 3079F DIAST BP 80-89 MM HG: CPT | Performed by: PHYSICIAN ASSISTANT

## 2022-12-07 PROCEDURE — 3008F BODY MASS INDEX DOCD: CPT | Performed by: PHYSICIAN ASSISTANT

## 2022-12-07 RX ORDER — AMOXICILLIN AND CLAVULANATE POTASSIUM 875; 125 MG/1; MG/1
1 TABLET, FILM COATED ORAL 2 TIMES DAILY
Qty: 20 TABLET | Refills: 0 | Status: SHIPPED | OUTPATIENT
Start: 2022-12-07 | End: 2022-12-17

## 2022-12-07 RX ORDER — ALBUTEROL SULFATE 90 UG/1
1 AEROSOL, METERED RESPIRATORY (INHALATION) EVERY 6 HOURS PRN
Qty: 8 G | Refills: 0 | Status: SHIPPED | OUTPATIENT
Start: 2022-12-07

## 2022-12-07 RX ORDER — ALBUTEROL SULFATE 90 UG/1
1 AEROSOL, METERED RESPIRATORY (INHALATION) EVERY 6 HOURS PRN
Qty: 1 EACH | Refills: 0 | Status: SHIPPED | OUTPATIENT
Start: 2022-12-07

## 2022-12-17 ENCOUNTER — LAB ENCOUNTER (OUTPATIENT)
Dept: LAB | Age: 62
End: 2022-12-17
Attending: INTERNAL MEDICINE
Payer: COMMERCIAL

## 2022-12-17 DIAGNOSIS — Z00.00 LABORATORY EXAMINATION ORDERED AS PART OF A ROUTINE GENERAL MEDICAL EXAMINATION: ICD-10-CM

## 2022-12-17 LAB
ALBUMIN SERPL-MCNC: 3.9 G/DL (ref 3.4–5)
ALBUMIN/GLOB SERPL: 1 {RATIO} (ref 1–2)
ALP LIVER SERPL-CCNC: 91 U/L
ALT SERPL-CCNC: 48 U/L
ANION GAP SERPL CALC-SCNC: 6 MMOL/L (ref 0–18)
AST SERPL-CCNC: 36 U/L (ref 15–37)
BASOPHILS # BLD AUTO: 0.05 X10(3) UL (ref 0–0.2)
BASOPHILS NFR BLD AUTO: 0.9 %
BILIRUB SERPL-MCNC: 0.6 MG/DL (ref 0.1–2)
BILIRUB UR QL STRIP.AUTO: NEGATIVE
BUN BLD-MCNC: 24 MG/DL (ref 7–18)
CALCIUM BLD-MCNC: 9.2 MG/DL (ref 8.5–10.1)
CHLORIDE SERPL-SCNC: 106 MMOL/L (ref 98–112)
CHOLEST SERPL-MCNC: 199 MG/DL (ref ?–200)
CLARITY UR REFRACT.AUTO: CLEAR
CO2 SERPL-SCNC: 25 MMOL/L (ref 21–32)
COLOR UR AUTO: YELLOW
COMPLEXED PSA SERPL-MCNC: 1.95 NG/ML (ref ?–4)
CREAT BLD-MCNC: 1.16 MG/DL
EOSINOPHIL # BLD AUTO: 0.18 X10(3) UL (ref 0–0.7)
EOSINOPHIL NFR BLD AUTO: 3.2 %
ERYTHROCYTE [DISTWIDTH] IN BLOOD BY AUTOMATED COUNT: 13.1 %
EST. AVERAGE GLUCOSE BLD GHB EST-MCNC: 105 MG/DL (ref 68–126)
FASTING PATIENT LIPID ANSWER: YES
FASTING STATUS PATIENT QL REPORTED: YES
GFR SERPLBLD BASED ON 1.73 SQ M-ARVRAT: 72 ML/MIN/1.73M2 (ref 60–?)
GLOBULIN PLAS-MCNC: 3.9 G/DL (ref 2.8–4.4)
GLUCOSE BLD-MCNC: 103 MG/DL (ref 70–99)
GLUCOSE UR STRIP.AUTO-MCNC: NEGATIVE MG/DL
HBA1C MFR BLD: 5.3 % (ref ?–5.7)
HCT VFR BLD AUTO: 47.6 %
HCV AB SERPL QL IA: NONREACTIVE
HDLC SERPL-MCNC: 31 MG/DL (ref 40–59)
HGB BLD-MCNC: 15.7 G/DL
IMM GRANULOCYTES # BLD AUTO: 0.03 X10(3) UL (ref 0–1)
IMM GRANULOCYTES NFR BLD: 0.5 %
KETONES UR STRIP.AUTO-MCNC: NEGATIVE MG/DL
LDLC SERPL CALC-MCNC: 124 MG/DL (ref ?–100)
LEUKOCYTE ESTERASE UR QL STRIP.AUTO: NEGATIVE
LYMPHOCYTES # BLD AUTO: 1.6 X10(3) UL (ref 1–4)
LYMPHOCYTES NFR BLD AUTO: 28.4 %
MCH RBC QN AUTO: 28 PG (ref 26–34)
MCHC RBC AUTO-ENTMCNC: 33 G/DL (ref 31–37)
MCV RBC AUTO: 85 FL
MONOCYTES # BLD AUTO: 0.6 X10(3) UL (ref 0.1–1)
MONOCYTES NFR BLD AUTO: 10.6 %
NEUTROPHILS # BLD AUTO: 3.18 X10 (3) UL (ref 1.5–7.7)
NEUTROPHILS # BLD AUTO: 3.18 X10(3) UL (ref 1.5–7.7)
NEUTROPHILS NFR BLD AUTO: 56.4 %
NITRITE UR QL STRIP.AUTO: NEGATIVE
NONHDLC SERPL-MCNC: 168 MG/DL (ref ?–130)
OSMOLALITY SERPL CALC.SUM OF ELEC: 288 MOSM/KG (ref 275–295)
PH UR STRIP.AUTO: 5 [PH] (ref 5–8)
PLATELET # BLD AUTO: 206 10(3)UL (ref 150–450)
POTASSIUM SERPL-SCNC: 4.5 MMOL/L (ref 3.5–5.1)
PROT SERPL-MCNC: 7.8 G/DL (ref 6.4–8.2)
PROT UR STRIP.AUTO-MCNC: NEGATIVE MG/DL
RBC # BLD AUTO: 5.6 X10(6)UL
RBC UR QL AUTO: NEGATIVE
SODIUM SERPL-SCNC: 137 MMOL/L (ref 136–145)
SP GR UR STRIP.AUTO: 1.02 (ref 1–1.03)
TRIGL SERPL-MCNC: 249 MG/DL (ref 30–149)
TSI SER-ACNC: 3.26 MIU/ML (ref 0.36–3.74)
UROBILINOGEN UR STRIP.AUTO-MCNC: <2 MG/DL
VLDLC SERPL CALC-MCNC: 45 MG/DL (ref 0–30)
WBC # BLD AUTO: 5.6 X10(3) UL (ref 4–11)

## 2022-12-17 PROCEDURE — 83036 HEMOGLOBIN GLYCOSYLATED A1C: CPT | Performed by: INTERNAL MEDICINE

## 2022-12-17 PROCEDURE — 81003 URINALYSIS AUTO W/O SCOPE: CPT | Performed by: INTERNAL MEDICINE

## 2022-12-17 PROCEDURE — 80061 LIPID PANEL: CPT | Performed by: INTERNAL MEDICINE

## 2022-12-17 PROCEDURE — 82300 ASSAY OF CADMIUM: CPT | Performed by: INTERNAL MEDICINE

## 2022-12-17 PROCEDURE — 86803 HEPATITIS C AB TEST: CPT | Performed by: INTERNAL MEDICINE

## 2022-12-17 PROCEDURE — 84153 ASSAY OF PSA TOTAL: CPT | Performed by: INTERNAL MEDICINE

## 2022-12-17 PROCEDURE — 83655 ASSAY OF LEAD: CPT | Performed by: INTERNAL MEDICINE

## 2022-12-17 PROCEDURE — 80050 GENERAL HEALTH PANEL: CPT | Performed by: INTERNAL MEDICINE

## 2022-12-17 PROCEDURE — 82175 ASSAY OF ARSENIC: CPT | Performed by: INTERNAL MEDICINE

## 2022-12-17 PROCEDURE — 83825 ASSAY OF MERCURY: CPT | Performed by: INTERNAL MEDICINE

## 2022-12-30 ENCOUNTER — OFFICE VISIT (OUTPATIENT)
Dept: INTERNAL MEDICINE CLINIC | Facility: CLINIC | Age: 62
End: 2022-12-30
Payer: COMMERCIAL

## 2022-12-30 VITALS
HEIGHT: 70 IN | OXYGEN SATURATION: 99 % | RESPIRATION RATE: 16 BRPM | BODY MASS INDEX: 35.79 KG/M2 | DIASTOLIC BLOOD PRESSURE: 78 MMHG | WEIGHT: 250 LBS | HEART RATE: 75 BPM | SYSTOLIC BLOOD PRESSURE: 134 MMHG | TEMPERATURE: 98 F

## 2022-12-30 DIAGNOSIS — Z23 NEED FOR DIPHTHERIA-TETANUS-PERTUSSIS (TDAP) VACCINE: ICD-10-CM

## 2022-12-30 DIAGNOSIS — Z00.00 ANNUAL PHYSICAL EXAM: Primary | ICD-10-CM

## 2022-12-30 PROCEDURE — 3078F DIAST BP <80 MM HG: CPT | Performed by: INTERNAL MEDICINE

## 2022-12-30 PROCEDURE — 3008F BODY MASS INDEX DOCD: CPT | Performed by: INTERNAL MEDICINE

## 2022-12-30 PROCEDURE — 90471 IMMUNIZATION ADMIN: CPT | Performed by: INTERNAL MEDICINE

## 2022-12-30 PROCEDURE — 3075F SYST BP GE 130 - 139MM HG: CPT | Performed by: INTERNAL MEDICINE

## 2022-12-30 PROCEDURE — 90715 TDAP VACCINE 7 YRS/> IM: CPT | Performed by: INTERNAL MEDICINE

## 2022-12-30 PROCEDURE — 99396 PREV VISIT EST AGE 40-64: CPT | Performed by: INTERNAL MEDICINE

## 2023-10-24 ENCOUNTER — OFFICE VISIT (OUTPATIENT)
Dept: INTERNAL MEDICINE CLINIC | Facility: CLINIC | Age: 63
End: 2023-10-24

## 2023-10-24 VITALS
RESPIRATION RATE: 16 BRPM | SYSTOLIC BLOOD PRESSURE: 136 MMHG | TEMPERATURE: 98 F | WEIGHT: 245 LBS | BODY MASS INDEX: 35.07 KG/M2 | DIASTOLIC BLOOD PRESSURE: 84 MMHG | OXYGEN SATURATION: 98 % | HEART RATE: 80 BPM | HEIGHT: 70 IN

## 2023-10-24 DIAGNOSIS — H65.02 NON-RECURRENT ACUTE SEROUS OTITIS MEDIA OF LEFT EAR: ICD-10-CM

## 2023-10-24 DIAGNOSIS — I10 ESSENTIAL HYPERTENSION: ICD-10-CM

## 2023-10-24 DIAGNOSIS — J01.00 ACUTE NON-RECURRENT MAXILLARY SINUSITIS: Primary | ICD-10-CM

## 2023-10-24 PROCEDURE — 99214 OFFICE O/P EST MOD 30 MIN: CPT | Performed by: PHYSICIAN ASSISTANT

## 2023-10-24 PROCEDURE — 3075F SYST BP GE 130 - 139MM HG: CPT | Performed by: PHYSICIAN ASSISTANT

## 2023-10-24 PROCEDURE — 3008F BODY MASS INDEX DOCD: CPT | Performed by: PHYSICIAN ASSISTANT

## 2023-10-24 PROCEDURE — 3079F DIAST BP 80-89 MM HG: CPT | Performed by: PHYSICIAN ASSISTANT

## 2023-10-24 RX ORDER — ALBUTEROL SULFATE 90 UG/1
1 AEROSOL, METERED RESPIRATORY (INHALATION) EVERY 6 HOURS PRN
Qty: 1 EACH | Refills: 0 | Status: SHIPPED | OUTPATIENT
Start: 2023-10-24

## 2023-10-24 RX ORDER — AMOXICILLIN AND CLAVULANATE POTASSIUM 875; 125 MG/1; MG/1
1 TABLET, FILM COATED ORAL 2 TIMES DAILY
Qty: 20 TABLET | Refills: 0 | Status: SHIPPED | OUTPATIENT
Start: 2023-10-24 | End: 2023-11-03

## 2023-11-02 ENCOUNTER — TELEPHONE (OUTPATIENT)
Dept: INTERNAL MEDICINE CLINIC | Facility: CLINIC | Age: 63
End: 2023-11-02

## 2023-11-02 DIAGNOSIS — J01.00 ACUTE NON-RECURRENT MAXILLARY SINUSITIS: Primary | ICD-10-CM

## 2023-11-02 RX ORDER — PREDNISONE 20 MG/1
40 TABLET ORAL DAILY
Qty: 14 TABLET | Refills: 0 | Status: SHIPPED | OUTPATIENT
Start: 2023-11-02 | End: 2023-11-09

## 2023-11-02 NOTE — TELEPHONE ENCOUNTER
Pt took medication as prescribed, 2 days remaining and patient still experiencing chest tightness, wet productive cough but can't cough out phlem. Mucinex taking 1x per day, inhaler its not helping yet. Losing cough. Concerned about bronchitis. Would like to discuss additional treatment options possible adding steroid?     Please call back to discuss

## 2023-11-02 NOTE — TELEPHONE ENCOUNTER
Pt continues to take abx and as needed inhaler. Also using OTC decongestant. Per LORENA Kelley: prednisone 40 mg x 7 days. Pt made aware to complete course of Augmentin and start using Albuterol inhaler scheduled one puff q6h. Reach out in 2-3 days if symptoms persist. Patient verbalized understanding and was grateful for call back.

## 2023-11-09 ENCOUNTER — OFFICE VISIT (OUTPATIENT)
Dept: INTERNAL MEDICINE CLINIC | Facility: CLINIC | Age: 63
End: 2023-11-09
Payer: COMMERCIAL

## 2023-11-09 ENCOUNTER — HOSPITAL ENCOUNTER (OUTPATIENT)
Dept: GENERAL RADIOLOGY | Age: 63
Discharge: HOME | End: 2023-11-09
Payer: COMMERCIAL

## 2023-11-09 VITALS
BODY MASS INDEX: 34.93 KG/M2 | WEIGHT: 244 LBS | RESPIRATION RATE: 16 BRPM | OXYGEN SATURATION: 97 % | DIASTOLIC BLOOD PRESSURE: 85 MMHG | HEIGHT: 70 IN | SYSTOLIC BLOOD PRESSURE: 135 MMHG | TEMPERATURE: 97 F | HEART RATE: 103 BPM

## 2023-11-09 DIAGNOSIS — J22 LOWER RESPIRATORY INFECTION: Primary | ICD-10-CM

## 2023-11-09 DIAGNOSIS — R05.2 SUBACUTE COUGH: ICD-10-CM

## 2023-11-09 DIAGNOSIS — J22 LOWER RESPIRATORY INFECTION: ICD-10-CM

## 2023-11-09 DIAGNOSIS — J98.01 BRONCHOSPASM: ICD-10-CM

## 2023-11-09 PROCEDURE — 3008F BODY MASS INDEX DOCD: CPT

## 2023-11-09 PROCEDURE — 99214 OFFICE O/P EST MOD 30 MIN: CPT

## 2023-11-09 PROCEDURE — 3079F DIAST BP 80-89 MM HG: CPT

## 2023-11-09 PROCEDURE — 3075F SYST BP GE 130 - 139MM HG: CPT

## 2023-11-09 PROCEDURE — 71046 X-RAY EXAM CHEST 2 VIEWS: CPT

## 2023-11-09 RX ORDER — LEVOFLOXACIN 750 MG/1
750 TABLET ORAL DAILY
Qty: 5 TABLET | Refills: 0 | Status: SHIPPED | OUTPATIENT
Start: 2023-11-09 | End: 2023-11-14

## 2023-11-09 RX ORDER — FLUTICASONE PROPIONATE 50 MCG
2 SPRAY, SUSPENSION (ML) NASAL DAILY
Qty: 11 ML | Refills: 0 | Status: SHIPPED | OUTPATIENT
Start: 2023-11-09 | End: 2024-11-03

## 2023-11-09 RX ORDER — PREDNISONE 20 MG/1
40 TABLET ORAL DAILY
Qty: 14 TABLET | Refills: 0 | Status: SHIPPED | OUTPATIENT
Start: 2023-11-09 | End: 2023-11-16

## 2023-11-09 RX ORDER — BUDESONIDE AND FORMOTEROL FUMARATE DIHYDRATE 160; 4.5 UG/1; UG/1
2 AEROSOL RESPIRATORY (INHALATION) 2 TIMES DAILY
Qty: 1 EACH | Refills: 0 | Status: SHIPPED | OUTPATIENT
Start: 2023-11-09 | End: 2023-11-23

## 2023-11-09 NOTE — PATIENT INSTRUCTIONS
Continue to take Mucinex with adequate water intake  Take steroid in the morning. Start using Flonase nasal two sprays each nostril. Take antibiotics as prescribed  Use inhaler two puffs twice a day. Please rinse mouth after each use.

## 2023-11-11 DIAGNOSIS — I10 ESSENTIAL HYPERTENSION: ICD-10-CM

## 2023-11-11 RX ORDER — OLMESARTAN MEDOXOMIL 20 MG/1
20 TABLET, FILM COATED ORAL DAILY
Qty: 90 TABLET | Refills: 3 | Status: SHIPPED | OUTPATIENT
Start: 2023-11-11

## 2023-11-11 NOTE — TELEPHONE ENCOUNTER
Last time medication was refilled 11/16/2022  Quantity and # of refills 90 w 3  Last OV 11/09/2023  Next OV 12/14/2023    Passed protocol, Rx sent.

## 2023-12-01 RX ORDER — FLUTICASONE PROPIONATE 50 MCG
2 SPRAY, SUSPENSION (ML) NASAL DAILY
Qty: 48 ML | Refills: 0 | Status: SHIPPED | OUTPATIENT
Start: 2023-12-01 | End: 2023-12-14

## 2023-12-01 NOTE — TELEPHONE ENCOUNTER
Medication requested: fluticasone propionate 50 MCG/ACT     Is patient requesting 30 or 90 day supply:  90    Pharmacy name/location:  [facility] [address] [place] [facility] [place], [place] - [phone], [phone] [address] [facility] [address] Phone: [phone] Fax: [phone] Hours: Not open 24 hours     LOV:  11/09/2023    Is the patient due for appointment: no

## 2023-12-06 RX ORDER — BUDESONIDE AND FORMOTEROL FUMARATE DIHYDRATE 160; 4.5 UG/1; UG/1
2 AEROSOL RESPIRATORY (INHALATION) 2 TIMES DAILY
Qty: 10.2 EACH | Refills: 0 | Status: SHIPPED | OUTPATIENT
Start: 2023-12-06

## 2023-12-06 NOTE — TELEPHONE ENCOUNTER
Last time medication was refilled 11/09/2023  Quantity and # of refills 1 w 0  Last OV 11/09/2023  Next OV 12/14/2023    Protocol failed     Sent to [facility] for approval.

## 2023-12-08 ENCOUNTER — LAB ENCOUNTER (OUTPATIENT)
Dept: LAB | Age: 63
End: 2023-12-08
Attending: INTERNAL MEDICINE
Payer: COMMERCIAL

## 2023-12-08 DIAGNOSIS — Z12.5 PROSTATE CANCER SCREENING: ICD-10-CM

## 2023-12-08 DIAGNOSIS — Z12.11 SCREENING FOR COLON CANCER: ICD-10-CM

## 2023-12-08 DIAGNOSIS — Z00.00 ANNUAL PHYSICAL EXAM: ICD-10-CM

## 2023-12-08 LAB
ALBUMIN SERPL-MCNC: 3.8 G/DL (ref 3.4–5)
ALBUMIN/GLOB SERPL: 1 {RATIO} (ref 1–2)
ALP LIVER SERPL-CCNC: 88 U/L
ALT SERPL-CCNC: 42 U/L
ANION GAP SERPL CALC-SCNC: 4 MMOL/L (ref 0–18)
AST SERPL-CCNC: 20 U/L (ref 15–37)
BASOPHILS # BLD: 0.11 X10(3) UL (ref 0–0.2)
BASOPHILS NFR BLD: 1 %
BILIRUB SERPL-MCNC: 0.3 MG/DL (ref 0.1–2)
BILIRUB UR QL STRIP.AUTO: NEGATIVE
BUN BLD-MCNC: 32 MG/DL (ref 9–23)
CALCIUM BLD-MCNC: 8.9 MG/DL (ref 8.5–10.1)
CHLORIDE SERPL-SCNC: 106 MMOL/L (ref 98–112)
CHOLEST SERPL-MCNC: 220 MG/DL (ref ?–200)
CLARITY UR REFRACT.AUTO: CLEAR
CO2 SERPL-SCNC: 28 MMOL/L (ref 21–32)
COMPLEXED PSA SERPL-MCNC: 2.53 NG/ML (ref ?–4)
CREAT BLD-MCNC: 1.11 MG/DL
EGFRCR SERPLBLD CKD-EPI 2021: 76 ML/MIN/1.73M2 (ref 60–?)
EOSINOPHIL # BLD: 0.11 X10(3) UL (ref 0–0.7)
EOSINOPHIL NFR BLD: 1 %
ERYTHROCYTE [DISTWIDTH] IN BLOOD BY AUTOMATED COUNT: 13.7 %
FASTING PATIENT LIPID ANSWER: YES
FASTING STATUS PATIENT QL REPORTED: YES
GLOBULIN PLAS-MCNC: 3.9 G/DL (ref 2.8–4.4)
GLUCOSE BLD-MCNC: 98 MG/DL (ref 70–99)
GLUCOSE UR STRIP.AUTO-MCNC: NORMAL MG/DL
HCT VFR BLD AUTO: 47.7 %
HDLC SERPL-MCNC: 38 MG/DL (ref 40–59)
HEMOCCULT STL QL: NEGATIVE
HGB BLD-MCNC: 15.2 G/DL
KETONES UR STRIP.AUTO-MCNC: NEGATIVE MG/DL
LDLC SERPL CALC-MCNC: 148 MG/DL (ref ?–100)
LEUKOCYTE ESTERASE UR QL STRIP.AUTO: NEGATIVE
LYMPHOCYTES NFR BLD: 3.47 X10(3) UL (ref 1–4)
LYMPHOCYTES NFR BLD: 33 %
MCH RBC QN AUTO: 28.1 PG (ref 26–34)
MCHC RBC AUTO-ENTMCNC: 31.9 G/DL (ref 31–37)
MCV RBC AUTO: 88.2 FL
MONOCYTES # BLD: 0.84 X10(3) UL (ref 0.1–1)
MONOCYTES NFR BLD: 8 %
MORPHOLOGY: NORMAL
NEUTROPHILS # BLD AUTO: 5.64 X10 (3) UL (ref 1.5–7.7)
NEUTROPHILS NFR BLD: 57 %
NEUTS HYPERSEG # BLD: 5.99 X10(3) UL (ref 1.5–7.7)
NITRITE UR QL STRIP.AUTO: NEGATIVE
NONHDLC SERPL-MCNC: 182 MG/DL (ref ?–130)
OSMOLALITY SERPL CALC.SUM OF ELEC: 293 MOSM/KG (ref 275–295)
PH UR STRIP.AUTO: 5 [PH] (ref 5–8)
PLATELET # BLD AUTO: 295 10(3)UL (ref 150–450)
PLATELET MORPHOLOGY: NORMAL
POTASSIUM SERPL-SCNC: 4.2 MMOL/L (ref 3.5–5.1)
PROT SERPL-MCNC: 7.7 G/DL (ref 6.4–8.2)
PROT UR STRIP.AUTO-MCNC: NEGATIVE MG/DL
RBC # BLD AUTO: 5.41 X10(6)UL
RBC UR QL AUTO: NEGATIVE
SODIUM SERPL-SCNC: 138 MMOL/L (ref 136–145)
SP GR UR STRIP.AUTO: 1.02 (ref 1–1.03)
T4 FREE SERPL-MCNC: 1 NG/DL (ref 0.8–1.7)
TOTAL CELLS COUNTED BLD: 100
TRIGL SERPL-MCNC: 185 MG/DL (ref 30–149)
TSI SER-ACNC: 3.83 MIU/ML (ref 0.36–3.74)
UROBILINOGEN UR STRIP.AUTO-MCNC: NORMAL MG/DL
VLDLC SERPL CALC-MCNC: 35 MG/DL (ref 0–30)
WBC # BLD AUTO: 10.5 X10(3) UL (ref 4–11)

## 2023-12-08 PROCEDURE — 80061 LIPID PANEL: CPT

## 2023-12-08 PROCEDURE — 82274 ASSAY TEST FOR BLOOD FECAL: CPT

## 2023-12-08 PROCEDURE — 36415 COLL VENOUS BLD VENIPUNCTURE: CPT

## 2023-12-08 PROCEDURE — 84443 ASSAY THYROID STIM HORMONE: CPT

## 2023-12-08 PROCEDURE — 85007 BL SMEAR W/DIFF WBC COUNT: CPT

## 2023-12-08 PROCEDURE — 80053 COMPREHEN METABOLIC PANEL: CPT

## 2023-12-08 PROCEDURE — 85025 COMPLETE CBC W/AUTO DIFF WBC: CPT

## 2023-12-08 PROCEDURE — 85027 COMPLETE CBC AUTOMATED: CPT

## 2023-12-08 PROCEDURE — 81003 URINALYSIS AUTO W/O SCOPE: CPT

## 2023-12-08 PROCEDURE — 84439 ASSAY OF FREE THYROXINE: CPT

## 2023-12-14 ENCOUNTER — OFFICE VISIT (OUTPATIENT)
Dept: INTERNAL MEDICINE CLINIC | Facility: CLINIC | Age: 63
End: 2023-12-14
Payer: COMMERCIAL

## 2023-12-14 VITALS
TEMPERATURE: 98 F | SYSTOLIC BLOOD PRESSURE: 132 MMHG | DIASTOLIC BLOOD PRESSURE: 80 MMHG | RESPIRATION RATE: 16 BRPM | HEIGHT: 70 IN | HEART RATE: 78 BPM | WEIGHT: 248 LBS | OXYGEN SATURATION: 98 % | BODY MASS INDEX: 35.5 KG/M2

## 2023-12-14 DIAGNOSIS — I10 ESSENTIAL HYPERTENSION: ICD-10-CM

## 2023-12-14 DIAGNOSIS — Z00.00 ANNUAL PHYSICAL EXAM: Primary | ICD-10-CM

## 2023-12-14 PROCEDURE — 3079F DIAST BP 80-89 MM HG: CPT | Performed by: INTERNAL MEDICINE

## 2023-12-14 PROCEDURE — 3008F BODY MASS INDEX DOCD: CPT | Performed by: INTERNAL MEDICINE

## 2023-12-14 PROCEDURE — 3075F SYST BP GE 130 - 139MM HG: CPT | Performed by: INTERNAL MEDICINE

## 2023-12-14 PROCEDURE — 99396 PREV VISIT EST AGE 40-64: CPT | Performed by: INTERNAL MEDICINE

## 2024-02-26 ENCOUNTER — TELEPHONE (OUTPATIENT)
Dept: INTERNAL MEDICINE CLINIC | Facility: CLINIC | Age: 64
End: 2024-02-26

## 2024-02-26 ENCOUNTER — PATIENT MESSAGE (OUTPATIENT)
Dept: INTERNAL MEDICINE CLINIC | Facility: CLINIC | Age: 64
End: 2024-02-26

## 2024-02-26 DIAGNOSIS — N40.0 BENIGN PROSTATIC HYPERPLASIA, UNSPECIFIED WHETHER LOWER URINARY TRACT SYMPTOMS PRESENT: ICD-10-CM

## 2024-02-26 DIAGNOSIS — N52.9 ERECTILE DYSFUNCTION, UNSPECIFIED ERECTILE DYSFUNCTION TYPE: Primary | ICD-10-CM

## 2024-02-26 RX ORDER — TADALAFIL 20 MG/1
20 TABLET ORAL
Qty: 10 TABLET | Refills: 5 | Status: SHIPPED
Start: 2024-02-26

## 2024-02-26 NOTE — TELEPHONE ENCOUNTER
From: Louie Rose  To: Margarito Prieto  Sent: 2024 10:14 AM CST  Subject: Tadalafil 20mg refill until December for my next physical.    Hi, Doctor Ida. I am requesting a new refill of my 20mg Tadalafil for my enlarged Prostrate. We talked about it during my last physical in December, but I didn't need a refill then. I would like the prescription sent to [facility] at [address], please. They have the best price in the area. My current prescription has  now and that is why I am requesting one now. You told me my PSA number keeps increasing over the last few years. I want to see if this is working that I need to take it monthly to keep my number the same or lower it or if I will need another after the results at my physical. Thank you and contact me at [phone] if you have any questions. Louie

## 2024-03-20 ENCOUNTER — OFFICE VISIT (OUTPATIENT)
Dept: INTERNAL MEDICINE CLINIC | Facility: CLINIC | Age: 64
End: 2024-03-20
Payer: COMMERCIAL

## 2024-03-20 VITALS
HEART RATE: 76 BPM | RESPIRATION RATE: 16 BRPM | HEIGHT: 70 IN | SYSTOLIC BLOOD PRESSURE: 156 MMHG | OXYGEN SATURATION: 98 % | DIASTOLIC BLOOD PRESSURE: 88 MMHG | BODY MASS INDEX: 36.08 KG/M2 | WEIGHT: 252 LBS | TEMPERATURE: 97 F

## 2024-03-20 DIAGNOSIS — B35.4 TINEA CORPORIS: Primary | ICD-10-CM

## 2024-03-20 DIAGNOSIS — I10 ESSENTIAL HYPERTENSION: ICD-10-CM

## 2024-03-20 PROCEDURE — 3077F SYST BP >= 140 MM HG: CPT | Performed by: PHYSICIAN ASSISTANT

## 2024-03-20 PROCEDURE — 99214 OFFICE O/P EST MOD 30 MIN: CPT | Performed by: PHYSICIAN ASSISTANT

## 2024-03-20 PROCEDURE — 3079F DIAST BP 80-89 MM HG: CPT | Performed by: PHYSICIAN ASSISTANT

## 2024-03-20 PROCEDURE — 3008F BODY MASS INDEX DOCD: CPT | Performed by: PHYSICIAN ASSISTANT

## 2024-03-20 RX ORDER — FLUCONAZOLE 150 MG/1
150 TABLET ORAL WEEKLY
Qty: 4 TABLET | Refills: 0 | Status: SHIPPED | OUTPATIENT
Start: 2024-03-20

## 2024-03-20 NOTE — PROGRESS NOTES
Louie Rose is a 63 year old male.  HPI:   Pt c/o rash on right lower leg x 1 week, itchy, red spots   Then noticed 2 spots on right foot, and one on back of scalp. Started after he started evening workouts, didn't shower/change right away afterward.  Has tried calamine lotion which didn't help    Patient presents for recheck of his hypertension. Pt has been taking medications as instructed, no medication side effects, doesn't check BP at home. Previously had been well controlled on benicar.    Current Outpatient Medications   Medication Sig Dispense Refill   • fluconazole 150 MG Oral Tab Take 1 tablet (150 mg total) by mouth once a week. Repeat in 7 days if needed. 4 tablet 0   • Tadalafil 20 MG Oral Tab Take 1 tablet (20 mg total) by mouth daily as needed for Erectile Dysfunction. 10 tablet 5   • olmesartan (BENICAR) 20 MG Oral Tab TAKE 1 TABLET BY MOUTH EVERY DAY 90 tablet 3   • Flaxseed, Linseed, (FLAX SEED OIL OR) Take by mouth.     • Multiple Vitamins-Minerals (MULTIVITAL OR) Take  by mouth.        Past Medical History:   Diagnosis Date   • Acute meniscal tear of left knee 10/31/2016    9.2016   • Disorder of prostate    • Hemorrhoids    • Hyperlipidemia 6/29/2012   • Hypertriglyceridemia 12/11/2018   • Hyperuricemia 4/28/2014   • Insulin resistance 3/19/2018   • Unspecified essential hypertension    • Unspecified sinusitis (chronic)    • Wears glasses       Social History:  Social History     Socioeconomic History   • Marital status:    Tobacco Use   • Smoking status: Never   • Smokeless tobacco: Never   Vaping Use   • Vaping Use: Never used   Substance and Sexual Activity   • Alcohol use: Yes     Alcohol/week: 1.0 standard drink of alcohol     Types: 1 Standard drinks or equivalent per week   • Drug use: No   Other Topics Concern   • Caffeine Concern Yes     Comment: 3 x per week    • Seat Belt No        REVIEW OF SYSTEMS:   GENERAL HEALTH: feels well otherwise. Denies fever, chills, unintentional  weight change  SKIN: denies any unusual skin lesions or rashes  RESPIRATORY: denies shortness of breath with exertion, denies cough or wheezing  CARDIOVASCULAR: denies chest pain or palpitations, denies leg swelling  GI: denies abdominal pain and denies heartburn. Denies nausea, vomiting, diarrhea, constipation  NEURO: denies headaches, dizziness, weakness, syncope    EXAM:   /88   Pulse 76   Temp 97.2 °F (36.2 °C)   Resp 16   Ht 5' 10\" (1.778 m)   Wt 252 lb (114.3 kg)   SpO2 98%   BMI 36.16 kg/m²   GENERAL: well developed, well nourished,in no apparent distress  SKIN: warm and dry. Erythematous patchy rash scattered on bl ankles and feet,  HEENT: atraumatic, normocephalic  NECK: supple,no adenopathy, no thyromegaly  LUNGS: clear to auscultation b/l no W/R/R  CARDIO: RRR without murmur  EXTREMITIES: no cyanosis, clubbing or edema  NEURO: a/ox3, no focal deficits  PSYCH: mood and affect normal    ASSESSMENT AND PLAN:   1. Tinea corporis (Primary)  -     Fluconazole; Take 1 tablet (150 mg total) by mouth once a week. Repeat in 7 days if needed.  Dispense: 4 tablet; Refill: 0  2. Essential hypertension  -elevated today, monitor BP at home, f/up 2 wks nurse visit for BP check      The patient indicates understanding of these issues and agrees to the plan.  Return in about 2 weeks (around 4/3/2024) for nurse visit for blood pressure check.

## 2024-04-04 ENCOUNTER — NURSE ONLY (OUTPATIENT)
Dept: INTERNAL MEDICINE CLINIC | Facility: CLINIC | Age: 64
End: 2024-04-04
Payer: COMMERCIAL

## 2024-04-04 VITALS — SYSTOLIC BLOOD PRESSURE: 160 MMHG | DIASTOLIC BLOOD PRESSURE: 88 MMHG

## 2024-04-04 DIAGNOSIS — I10 ESSENTIAL HYPERTENSION: ICD-10-CM

## 2024-04-04 PROCEDURE — 3079F DIAST BP 80-89 MM HG: CPT | Performed by: INTERNAL MEDICINE

## 2024-04-04 PROCEDURE — 3077F SYST BP >= 140 MM HG: CPT | Performed by: INTERNAL MEDICINE

## 2024-04-04 RX ORDER — OLMESARTAN MEDOXOMIL 40 MG/1
40 TABLET ORAL DAILY
COMMUNITY
Start: 2024-04-04

## 2024-04-04 NOTE — PROGRESS NOTES
Patient is here for BP check  /88  Per  increase Benicar to 40 mg daily and recheck in 2 weeks  Pt notified  Pt will double on med for now and will follow up in the office either with nurse visit or with provider

## 2024-04-08 NOTE — TELEPHONE ENCOUNTER
See result note. [Chaperone Present] : A chaperone was present in the examining room during all aspects of the physical examination [Appropriately responsive] : appropriately responsive [Alert] : alert [No Acute Distress] : no acute distress [No Lymphadenopathy] : no lymphadenopathy [Regular Rate Rhythm] : regular rate rhythm [No Murmurs] : no murmurs [Clear to Auscultation B/L] : clear to auscultation bilaterally [Soft] : soft [Non-tender] : non-tender [Non-distended] : non-distended [No HSM] : No HSM [No Lesions] : no lesions [No Mass] : no mass [Oriented x3] : oriented x3 [Examination Of The Breasts] : a normal appearance [No Masses] : no breast masses were palpable [Labia Majora] : normal [Labia Minora] : normal [Normal] : normal [Uterine Adnexae] : normal [FreeTextEntry9] : Guaiac test negative, no masses noted

## 2024-04-24 ENCOUNTER — OFFICE VISIT (OUTPATIENT)
Dept: INTERNAL MEDICINE CLINIC | Facility: CLINIC | Age: 64
End: 2024-04-24
Payer: COMMERCIAL

## 2024-04-24 VITALS
BODY MASS INDEX: 36.65 KG/M2 | RESPIRATION RATE: 14 BRPM | DIASTOLIC BLOOD PRESSURE: 86 MMHG | TEMPERATURE: 98 F | WEIGHT: 256 LBS | OXYGEN SATURATION: 98 % | SYSTOLIC BLOOD PRESSURE: 135 MMHG | HEART RATE: 68 BPM | HEIGHT: 70 IN

## 2024-04-24 DIAGNOSIS — J30.2 SEASONAL ALLERGIES: ICD-10-CM

## 2024-04-24 DIAGNOSIS — I10 ESSENTIAL HYPERTENSION: Primary | ICD-10-CM

## 2024-04-24 PROCEDURE — 3075F SYST BP GE 130 - 139MM HG: CPT | Performed by: NURSE PRACTITIONER

## 2024-04-24 PROCEDURE — 3008F BODY MASS INDEX DOCD: CPT | Performed by: NURSE PRACTITIONER

## 2024-04-24 PROCEDURE — 3079F DIAST BP 80-89 MM HG: CPT | Performed by: NURSE PRACTITIONER

## 2024-04-24 PROCEDURE — 99214 OFFICE O/P EST MOD 30 MIN: CPT | Performed by: NURSE PRACTITIONER

## 2024-04-24 RX ORDER — IPRATROPIUM BROMIDE 42 UG/1
2 SPRAY, METERED NASAL 4 TIMES DAILY
COMMUNITY
End: 2024-04-24

## 2024-04-24 RX ORDER — IPRATROPIUM BROMIDE 42 UG/1
2 SPRAY, METERED NASAL 3 TIMES DAILY
Qty: 1 EACH | Refills: 2 | Status: SHIPPED | OUTPATIENT
Start: 2024-04-24

## 2024-04-24 RX ORDER — OLMESARTAN MEDOXOMIL 40 MG/1
40 TABLET ORAL DAILY
Qty: 90 TABLET | Refills: 1 | Status: SHIPPED | OUTPATIENT
Start: 2024-04-24

## 2024-04-24 NOTE — PROGRESS NOTES
HPI:    Patient ID: Louie Rose is a 63 year old male.    Chief Complaint   Patient presents with    Hypertension       Reports blood pressure is 135/80's at home. Reports feeling better with higher dose of olmesartan. He knows his stress causes his blood pressure to rise. He is now retired and has been working on weight loss. He is using ipratropium nasal spray with improvement in allergy symptoms.         Review of Systems   Constitutional: Negative.    HENT:  Positive for congestion and rhinorrhea.    Respiratory: Negative.     Cardiovascular: Negative.    Gastrointestinal: Negative.          Past Medical History:    Acute meniscal tear of left knee    9.2016    Disorder of prostate    Hemorrhoids    Hyperlipidemia    Hypertriglyceridemia    Hyperuricemia    Insulin resistance    Unspecified essential hypertension    Unspecified sinusitis (chronic)    Wears glasses     Past Surgical History:   Procedure Laterality Date    Colonoscopy  2007    Colonoscopy  2013    Other surgical history      Tip left hand middle finger. Amputation of middle finger , With Neurectomy     Family History   Problem Relation Age of Onset    Other (Cerebral Artery Occlusion With Cerebral Infaction) Father     Cancer Mother         Ovarian    Ovarian Cancer Mother     Hypertension Sister     Prostate Cancer Brother     Colon Polyps Brother     Hypertension Brother     Diabetes Brother      Social History     Socioeconomic History    Marital status:    Tobacco Use    Smoking status: Never    Smokeless tobacco: Never   Vaping Use    Vaping status: Never Used   Substance and Sexual Activity    Alcohol use: Yes     Alcohol/week: 1.0 standard drink of alcohol     Types: 1 Standard drinks or equivalent per week    Drug use: No   Other Topics Concern    Caffeine Concern Yes     Comment: 3 x per week     Seat Belt No          Current Outpatient Medications   Medication Sig Dispense Refill    ipratropium 0.06 % Nasal Solution 2 sprays by  Nasal route 3 (three) times daily. 1 each 2    Olmesartan Medoxomil (BENICAR) 40 MG Oral Tab Take 1 tablet (40 mg total) by mouth daily. 90 tablet 1    Tadalafil 20 MG Oral Tab Take 1 tablet (20 mg total) by mouth daily as needed for Erectile Dysfunction. 10 tablet 5    Flaxseed, Linseed, (FLAX SEED OIL OR) Take by mouth.      Multiple Vitamins-Minerals (MULTIVITAL OR) Take  by mouth.       Allergies:  Allergies   Allergen Reactions    Red Dye HIVES     CAN'T TAKE THE GENERIC BENICAR BECAUSE ALLERGIC TO THE RED DYE# 5 THAT'S IN THE GENERIC.    Vasotec [Enalapril] Coughing       Lab Results   Component Value Date    GLU 98 12/08/2023    BUN 32 (H) 12/08/2023    BUNCREA 16.1 01/19/2021    CREATSERUM 1.11 12/08/2023    ANIONGAP 4 12/08/2023    GFR 77 12/05/2017    GFRNAA 81 04/11/2022    GFRAA 94 04/11/2022    CA 8.9 12/08/2023    OSMOCALC 293 12/08/2023    ALKPHO 88 12/08/2023    AST 20 12/08/2023    ALT 42 12/08/2023    BILT 0.3 12/08/2023    TP 7.7 12/08/2023    ALB 3.8 12/08/2023    GLOBULIN 3.9 12/08/2023     12/08/2023    K 4.2 12/08/2023     12/08/2023    CO2 28.0 12/08/2023     Lab Results   Component Value Date    WBC 10.5 12/08/2023    RBC 5.41 12/08/2023    HGB 15.2 12/08/2023    HCT 47.7 12/08/2023    MCV 88.2 12/08/2023    MCH 28.1 12/08/2023    MCHC 31.9 12/08/2023    RDW 13.7 12/08/2023    .0 12/08/2023    MPV 10.2 (H) 07/25/2012     Lab Results   Component Value Date    CHOLEST 220 (H) 12/08/2023    TRIG 185 (H) 12/08/2023    HDL 38 (L) 12/08/2023     (H) 12/08/2023    VLDL 35 (H) 12/08/2023    TCHDLRATIO 5.49 (H) 12/05/2017    NONHDLC 182 (H) 12/08/2023     Lab Results   Component Value Date     12/17/2022    A1C 5.3 12/17/2022     Lab Results   Component Value Date    T4F 1.0 12/08/2023    TSH 3.830 (H) 12/08/2023     No results found for: \"VITD\", \"QVITD\", \"PIUN56YX\"  No results found for: \"SIDDHARTH\"  No results found for: \"FOLIC\", \"FOLATESER\", \"FOLATE\"  No results found  for: \"IRON\", \"IRONTOT\"  No results found for: \"B12\", \"VITB12\"  No results found for: \"PHOS\", \"PHOSPHORUS\"  No results found for: \"MG\"     PHYSICAL EXAM:   /86   Pulse 68   Temp 97.7 °F (36.5 °C)   Resp 14   Ht 5' 10\" (1.778 m)   Wt 256 lb (116.1 kg)   SpO2 98%   BMI 36.73 kg/m²   Physical Exam  Vitals and nursing note reviewed.   Constitutional:       Appearance: Normal appearance.   Cardiovascular:      Rate and Rhythm: Normal rate and regular rhythm.      Pulses: Normal pulses.      Heart sounds: Normal heart sounds. No murmur heard.  Pulmonary:      Effort: Pulmonary effort is normal. No respiratory distress.      Breath sounds: Normal breath sounds.   Musculoskeletal:      Right lower leg: No edema.      Left lower leg: No edema.   Skin:     General: Skin is warm and dry.   Neurological:      Mental Status: He is alert and oriented to person, place, and time.   Psychiatric:         Mood and Affect: Mood normal.              ASSESSMENT/PLAN:   Diagnoses and all orders for this visit:    Essential hypertension  -     Olmesartan Medoxomil (BENICAR) 40 MG Oral Tab; Take 1 tablet (40 mg total) by mouth daily.    Seasonal allergies  -     ipratropium 0.06 % Nasal Solution; 2 sprays by Nasal route 3 (three) times daily.        LUIS Driver

## 2024-08-20 NOTE — PROGRESS NOTES
HPI:   Patient presents with symptoms of sinus pressure/pain, rhinorrhea (yellow mucus),  PND, productive cough (yellow mucus), congestion since March,  Denies fever, body aches, chills.   Symptoms started since when to [place].  Has been taking Claritin, Ipratropium, Mucinex, netti pot.       HTN- home systolic in range of low 140's and diastolic in 80's. Adherent to medication. Denies headache, vision changes.   Pt aims to get 10/15K steps a day.  Monitoring salt intake.     Pt has been monitoring his diet and getting his steps in but has not been able to loose weight.   Current Outpatient Medications   Medication Sig Dispense Refill    predniSONE 20 MG Oral Tab Take 2 tablets (40 mg total) by mouth daily for 7 days. 14 tablet 0    amoxicillin clavulanate 875-125 MG Oral Tab Take 1 tablet by mouth 2 (two) times daily for 10 days. 20 tablet 0    ipratropium 0.06 % Nasal Solution 2 sprays by Nasal route 3 (three) times daily. 1 each 2    Olmesartan Medoxomil (BENICAR) 40 MG Oral Tab Take 1 tablet (40 mg total) by mouth daily. 90 tablet 1    Tadalafil 20 MG Oral Tab Take 1 tablet (20 mg total) by mouth daily as needed for Erectile Dysfunction. 10 tablet 5    Flaxseed, Linseed, (FLAX SEED OIL OR) Take by mouth.      Multiple Vitamins-Minerals (MULTIVITAL OR) Take  by mouth.        Past Medical History:    Acute meniscal tear of left knee    9.2016    Disorder of prostate    Hemorrhoids    Hyperlipidemia    Hypertriglyceridemia    Hyperuricemia    Insulin resistance    Unspecified essential hypertension    Unspecified sinusitis (chronic)    Wears glasses      Social History:  Social History     Socioeconomic History    Marital status:    Tobacco Use    Smoking status: Never    Smokeless tobacco: Never   Vaping Use    Vaping status: Never Used   Substance and Sexual Activity    Alcohol use: Yes     Alcohol/week: 1.0 standard drink of alcohol     Types: 1 Standard drinks or equivalent per week    Drug use: No    Other Topics Concern    Caffeine Concern Yes     Comment: 3 x per week     Seat Belt No         REVIEW OF SYSTEMS:   Review of Systems   Constitutional: Negative.    HENT:  Positive for congestion, postnasal drip, rhinorrhea, sinus pressure and sinus pain. Negative for sore throat, tinnitus and trouble swallowing.    Respiratory:  Positive for cough. Negative for shortness of breath, wheezing and stridor.    Cardiovascular: Negative.    Gastrointestinal: Negative.    Neurological: Negative.    Psychiatric/Behavioral: Negative.          EXAM:   /80   Pulse 80   Temp 98 °F (36.7 °C)   Resp 16   Ht 5' 10\" (1.778 m)   Wt 252 lb (114.3 kg)   SpO2 98%   BMI 36.16 kg/m²   Physical Exam  Vitals and nursing note reviewed.   Constitutional:       Appearance: Normal appearance. He is normal weight.   HENT:      Right Ear: A middle ear effusion is present. Tympanic membrane is not injected or erythematous.      Left Ear: A middle ear effusion is present. Tympanic membrane is not injected or erythematous.      Nose: Mucosal edema present.      Right Turbinates: Swollen.      Left Turbinates: Swollen.      Right Sinus: Maxillary sinus tenderness and frontal sinus tenderness present.      Left Sinus: Maxillary sinus tenderness and frontal sinus tenderness present.      Mouth/Throat:      Lips: Pink.      Mouth: Mucous membranes are moist.      Pharynx: Uvula midline. Posterior oropharyngeal erythema (PND) present.      Tonsils: No tonsillar exudate or tonsillar abscesses.   Cardiovascular:      Rate and Rhythm: Normal rate and regular rhythm.      Pulses: Normal pulses.      Heart sounds: Normal heart sounds.   Pulmonary:      Effort: Pulmonary effort is normal.      Breath sounds: Normal breath sounds.   Skin:     General: Skin is warm and dry.      Capillary Refill: Capillary refill takes less than 2 seconds.   Neurological:      General: No focal deficit present.      Mental Status: He is alert and oriented to  person, place, and time. Mental status is at baseline.   Psychiatric:         Mood and Affect: Mood normal.         Thought Content: Thought content normal.         Judgment: Judgment normal.          ASSESSMENT AND PLAN:     Encounter Diagnoses   Name Primary?    Acute non-recurrent pansinusitis  -finish prednisone and antibiotic therapy. Pt to notify office if symptoms do not improve for possible change of abx and referral to ENT. Discussed to continue with antihistamine daily and Ipratropium nasal spray Yes    Essential hypertension  -not controlled. Discussed adding Amlodipine. Pt today declines additional medication. Will attempt in losing weight. Will notify office if home BP will be increasing.     Obesity (BMI 30-39.9)  -discussed healthy choices/exercise. Pt declines today nutritionist referral. Pt will increase his cardio work outs.         Requested Prescriptions     Signed Prescriptions Disp Refills    predniSONE 20 MG Oral Tab 14 tablet 0     Sig: Take 2 tablets (40 mg total) by mouth daily for 7 days.    amoxicillin clavulanate 875-125 MG Oral Tab 20 tablet 0     Sig: Take 1 tablet by mouth 2 (two) times daily for 10 days.       Instructions given on increasing fluid intake  The patient indicates understanding of these issues and agrees to the plan.  The patient is asked to return in 3 days if not better. Call if fever or worsening symptoms.   Pt has a physical scheduled in December.

## 2024-08-21 ENCOUNTER — OFFICE VISIT (OUTPATIENT)
Dept: INTERNAL MEDICINE CLINIC | Facility: CLINIC | Age: 64
End: 2024-08-21
Payer: COMMERCIAL

## 2024-08-21 VITALS
TEMPERATURE: 98 F | DIASTOLIC BLOOD PRESSURE: 80 MMHG | RESPIRATION RATE: 16 BRPM | WEIGHT: 252 LBS | SYSTOLIC BLOOD PRESSURE: 150 MMHG | BODY MASS INDEX: 36.08 KG/M2 | HEART RATE: 80 BPM | HEIGHT: 70 IN | OXYGEN SATURATION: 98 %

## 2024-08-21 DIAGNOSIS — I10 ESSENTIAL HYPERTENSION: ICD-10-CM

## 2024-08-21 DIAGNOSIS — J01.40 ACUTE NON-RECURRENT PANSINUSITIS: Primary | ICD-10-CM

## 2024-08-21 DIAGNOSIS — E66.9 OBESITY (BMI 30-39.9): ICD-10-CM

## 2024-08-21 PROCEDURE — 3079F DIAST BP 80-89 MM HG: CPT

## 2024-08-21 PROCEDURE — G2211 COMPLEX E/M VISIT ADD ON: HCPCS

## 2024-08-21 PROCEDURE — 3008F BODY MASS INDEX DOCD: CPT

## 2024-08-21 PROCEDURE — 3077F SYST BP >= 140 MM HG: CPT

## 2024-08-21 PROCEDURE — 99214 OFFICE O/P EST MOD 30 MIN: CPT

## 2024-08-21 RX ORDER — PREDNISONE 20 MG/1
40 TABLET ORAL DAILY
Qty: 14 TABLET | Refills: 0 | Status: SHIPPED | OUTPATIENT
Start: 2024-08-21 | End: 2024-08-28

## 2024-10-24 DIAGNOSIS — I10 ESSENTIAL HYPERTENSION: ICD-10-CM

## 2024-10-24 RX ORDER — OLMESARTAN MEDOXOMIL 40 MG/1
40 TABLET, FILM COATED ORAL DAILY
Qty: 90 TABLET | Refills: 0 | Status: SHIPPED | OUTPATIENT
Start: 2024-10-24

## 2024-10-24 NOTE — TELEPHONE ENCOUNTER
Last time medication was refilled 04/24/2024  Last office visit  08/21/2024  Next office visit due/scheduled   Future Appointments   Date Time Provider Department Center   12/16/2024 10:00 AM Margarito Prieto MD [facility] [address]     Medication failed protocol.

## 2024-12-11 ENCOUNTER — LAB ENCOUNTER (OUTPATIENT)
Dept: LAB | Age: 64
End: 2024-12-11
Attending: INTERNAL MEDICINE
Payer: COMMERCIAL

## 2024-12-11 DIAGNOSIS — Z00.00 ANNUAL PHYSICAL EXAM: ICD-10-CM

## 2024-12-11 DIAGNOSIS — Z12.5 PROSTATE CANCER SCREENING: ICD-10-CM

## 2024-12-11 LAB
ALBUMIN SERPL-MCNC: 4.5 G/DL (ref 3.2–4.8)
ALBUMIN/GLOB SERPL: 1.2 {RATIO} (ref 1–2)
ALP LIVER SERPL-CCNC: 95 U/L
ALT SERPL-CCNC: 63 U/L
ANION GAP SERPL CALC-SCNC: 12 MMOL/L (ref 0–18)
AST SERPL-CCNC: 42 U/L (ref ?–34)
BASOPHILS # BLD AUTO: 0.04 X10(3) UL (ref 0–0.2)
BASOPHILS NFR BLD AUTO: 0.7 %
BILIRUB SERPL-MCNC: 0.5 MG/DL (ref 0.2–1.1)
BILIRUB UR QL STRIP.AUTO: NEGATIVE
BUN BLD-MCNC: 16 MG/DL (ref 9–23)
CALCIUM BLD-MCNC: 9.6 MG/DL (ref 8.7–10.4)
CHLORIDE SERPL-SCNC: 104 MMOL/L (ref 98–112)
CHOLEST SERPL-MCNC: 191 MG/DL (ref ?–200)
CLARITY UR REFRACT.AUTO: CLEAR
CO2 SERPL-SCNC: 25 MMOL/L (ref 21–32)
COMPLEXED PSA SERPL-MCNC: 1.46 NG/ML (ref ?–4)
CREAT BLD-MCNC: 1.03 MG/DL
EGFRCR SERPLBLD CKD-EPI 2021: 82 ML/MIN/1.73M2 (ref 60–?)
EOSINOPHIL # BLD AUTO: 0.31 X10(3) UL (ref 0–0.7)
EOSINOPHIL NFR BLD AUTO: 5.5 %
ERYTHROCYTE [DISTWIDTH] IN BLOOD BY AUTOMATED COUNT: 13.2 %
FASTING PATIENT LIPID ANSWER: YES
FASTING STATUS PATIENT QL REPORTED: YES
GLOBULIN PLAS-MCNC: 3.7 G/DL (ref 2–3.5)
GLUCOSE BLD-MCNC: 92 MG/DL (ref 70–99)
GLUCOSE UR STRIP.AUTO-MCNC: NORMAL MG/DL
HCT VFR BLD AUTO: 47.7 %
HDLC SERPL-MCNC: 32 MG/DL (ref 40–59)
HGB BLD-MCNC: 16 G/DL
IMM GRANULOCYTES # BLD AUTO: 0.02 X10(3) UL (ref 0–1)
IMM GRANULOCYTES NFR BLD: 0.4 %
KETONES UR STRIP.AUTO-MCNC: NEGATIVE MG/DL
LDLC SERPL CALC-MCNC: 102 MG/DL (ref ?–100)
LEUKOCYTE ESTERASE UR QL STRIP.AUTO: NEGATIVE
LYMPHOCYTES # BLD AUTO: 1.64 X10(3) UL (ref 1–4)
LYMPHOCYTES NFR BLD AUTO: 28.9 %
MCH RBC QN AUTO: 29.1 PG (ref 26–34)
MCHC RBC AUTO-ENTMCNC: 33.5 G/DL (ref 31–37)
MCV RBC AUTO: 86.7 FL
MONOCYTES # BLD AUTO: 0.62 X10(3) UL (ref 0.1–1)
MONOCYTES NFR BLD AUTO: 10.9 %
NEUTROPHILS # BLD AUTO: 3.05 X10 (3) UL (ref 1.5–7.7)
NEUTROPHILS # BLD AUTO: 3.05 X10(3) UL (ref 1.5–7.7)
NEUTROPHILS NFR BLD AUTO: 53.6 %
NITRITE UR QL STRIP.AUTO: NEGATIVE
NONHDLC SERPL-MCNC: 159 MG/DL (ref ?–130)
OSMOLALITY SERPL CALC.SUM OF ELEC: 293 MOSM/KG (ref 275–295)
PH UR STRIP.AUTO: 5 [PH] (ref 5–8)
PLATELET # BLD AUTO: 191 10(3)UL (ref 150–450)
POTASSIUM SERPL-SCNC: 4.4 MMOL/L (ref 3.5–5.1)
PROT SERPL-MCNC: 8.2 G/DL (ref 5.7–8.2)
PROT UR STRIP.AUTO-MCNC: NEGATIVE MG/DL
RBC # BLD AUTO: 5.5 X10(6)UL
RBC UR QL AUTO: NEGATIVE
SODIUM SERPL-SCNC: 141 MMOL/L (ref 136–145)
SP GR UR STRIP.AUTO: 1.02 (ref 1–1.03)
TRIGL SERPL-MCNC: 334 MG/DL (ref 30–149)
TSI SER-ACNC: 3.83 UIU/ML (ref 0.55–4.78)
UROBILINOGEN UR STRIP.AUTO-MCNC: NORMAL MG/DL
VLDLC SERPL CALC-MCNC: 57 MG/DL (ref 0–30)
WBC # BLD AUTO: 5.7 X10(3) UL (ref 4–11)

## 2024-12-11 PROCEDURE — 84443 ASSAY THYROID STIM HORMONE: CPT

## 2024-12-11 PROCEDURE — 81003 URINALYSIS AUTO W/O SCOPE: CPT

## 2024-12-11 PROCEDURE — 36415 COLL VENOUS BLD VENIPUNCTURE: CPT

## 2024-12-11 PROCEDURE — 80053 COMPREHEN METABOLIC PANEL: CPT

## 2024-12-11 PROCEDURE — 80061 LIPID PANEL: CPT

## 2024-12-11 PROCEDURE — 85025 COMPLETE CBC W/AUTO DIFF WBC: CPT

## 2024-12-16 ENCOUNTER — OFFICE VISIT (OUTPATIENT)
Dept: INTERNAL MEDICINE CLINIC | Facility: CLINIC | Age: 64
End: 2024-12-16
Payer: COMMERCIAL

## 2024-12-16 VITALS
TEMPERATURE: 98 F | HEART RATE: 81 BPM | DIASTOLIC BLOOD PRESSURE: 80 MMHG | RESPIRATION RATE: 16 BRPM | OXYGEN SATURATION: 98 % | WEIGHT: 248 LBS | HEIGHT: 70 IN | SYSTOLIC BLOOD PRESSURE: 136 MMHG | BODY MASS INDEX: 35.5 KG/M2

## 2024-12-16 DIAGNOSIS — I10 ESSENTIAL HYPERTENSION: ICD-10-CM

## 2024-12-16 DIAGNOSIS — Z00.00 ANNUAL PHYSICAL EXAM: Primary | ICD-10-CM

## 2024-12-16 DIAGNOSIS — N40.0 BENIGN PROSTATIC HYPERPLASIA, UNSPECIFIED WHETHER LOWER URINARY TRACT SYMPTOMS PRESENT: ICD-10-CM

## 2024-12-16 DIAGNOSIS — N52.9 ERECTILE DYSFUNCTION, UNSPECIFIED ERECTILE DYSFUNCTION TYPE: ICD-10-CM

## 2024-12-16 RX ORDER — OLMESARTAN MEDOXOMIL 40 MG/1
40 TABLET ORAL DAILY
Qty: 90 TABLET | Refills: 1 | Status: SHIPPED | OUTPATIENT
Start: 2024-12-16

## 2024-12-16 RX ORDER — TADALAFIL 20 MG/1
20 TABLET ORAL
Qty: 20 TABLET | Refills: 2 | Status: SHIPPED | OUTPATIENT
Start: 2024-12-16

## 2024-12-16 NOTE — PROGRESS NOTES
Subjective:   Patient ID: Louie Rose is a 64 year old male.    HPI  Louie Rose is a 64 year old male who presents for a complete physical exam.   HPI:   Pt reports feeling fine and normal state of health  He denies cp or sob    PAST MEDICAL, SOCIAL, FAMILY HISTORIES REVIEWED WITH PT  .    Immunization History   Administered Date(s) Administered    Covid-19 Vaccine Pfizer 30 mcg/0.3 ml 12/21/2020, 01/11/2021    Covid-19 Vaccine Pfizer Bivalent 30mcg/0.3mL 10/18/2022    DTAP 01/01/2009    MMR 02/04/2016    TDAP 12/30/2022   Pended Date(s) Pended    FLULAVAL 6 months & older 0.5 ml Prefilled syringe (07983) 10/29/2020     Wt Readings from Last 6 Encounters:   12/16/24 248 lb (112.5 kg)   08/21/24 252 lb (114.3 kg)   04/24/24 256 lb (116.1 kg)   03/20/24 252 lb (114.3 kg)   12/14/23 248 lb (112.5 kg)   11/09/23 244 lb (110.7 kg)     Body mass index is 35.58 kg/m².     Lab Results   Component Value Date    GLU 92 12/11/2024    GLU 98 12/08/2023     (H) 12/17/2022     Lab Results   Component Value Date    CHOLEST 191 12/11/2024    CHOLEST 220 (H) 12/08/2023    CHOLEST 199 12/17/2022     Lab Results   Component Value Date    HDL 32 (L) 12/11/2024    HDL 38 (L) 12/08/2023    HDL 31 (L) 12/17/2022     Lab Results   Component Value Date     (H) 12/11/2024     (H) 12/08/2023     (H) 12/17/2022     Lab Results   Component Value Date    AST 42 (H) 12/11/2024    AST 20 12/08/2023    AST 36 12/17/2022     Lab Results   Component Value Date    ALT 63 (H) 12/11/2024    ALT 42 12/08/2023    ALT 48 12/17/2022     Lab Results   Component Value Date    PSA 1.2 02/22/2018        Current Outpatient Medications   Medication Sig Dispense Refill    Tadalafil 20 MG Oral Tab Take 1 tablet (20 mg total) by mouth daily as needed for Erectile Dysfunction. 20 tablet 2    Olmesartan Medoxomil (BENICAR) 40 MG Oral Tab Take 1 tablet (40 mg total) by mouth daily. 90 tablet 1    ipratropium 0.06 % Nasal Solution 2  sprays by Nasal route 3 (three) times daily. 1 each 2    Flaxseed, Linseed, (FLAX SEED OIL OR) Take by mouth.      Multiple Vitamins-Minerals (MULTIVITAL OR) Take  by mouth.        Past Medical History:    Acute meniscal tear of left knee    9.2016    Disorder of prostate    Hemorrhoids    Hyperlipidemia    Hypertriglyceridemia    Hyperuricemia    Insulin resistance    Unspecified essential hypertension    Unspecified sinusitis (chronic)    Wears glasses      Past Surgical History:   Procedure Laterality Date    Colonoscopy  2007    Colonoscopy  2013    Other surgical history      Tip left hand middle finger. Amputation of middle finger , With Neurectomy      Family History   Problem Relation Age of Onset    Other (Cerebral Artery Occlusion With Cerebral Infaction) Father     Cancer Mother         Ovarian    Ovarian Cancer Mother     Hypertension Sister     Prostate Cancer Brother     Colon Polyps Brother     Hypertension Brother     Diabetes Brother       Social History:  Social History     Socioeconomic History    Marital status:    Tobacco Use    Smoking status: Never    Smokeless tobacco: Never   Vaping Use    Vaping status: Never Used   Substance and Sexual Activity    Alcohol use: Yes     Alcohol/week: 1.0 standard drink of alcohol     Types: 1 Standard drinks or equivalent per week    Drug use: No   Other Topics Concern    Caffeine Concern Yes     Comment: 3 x per week     Seat Belt No      Occ: yes. : yes. Children: yes.   Exercise: minimal.  Diet: watches minimally     REVIEW OF SYSTEMS:   A comprehensive 10 point review of systems was completed.     Pertinent positives and negatives noted in the HPI.      EXAM:   /80   Pulse 81   Temp 97.9 °F (36.6 °C)   Resp 16   Ht 5' 10\" (1.778 m)   Wt 248 lb (112.5 kg)   SpO2 98%   BMI 35.58 kg/m²   Body mass index is 35.58 kg/m².   GENERAL: well developed, well nourished,in no apparent distress  SKIN: no rashes,no suspicious lesions  HEENT:  atraumatic, normocephalic,ears and throat are clear  EYES:PERRLA, EOMI, conjunctiva are clear  NECK: supple,no adenopathy,no bruits  LUNGS: clear to auscultation  CARDIO: RRR without murmur  GI: good BS's,no masses, HSM or tenderness  :deferred  MUSCULOSKELETAL: back is not tender,  EXTREMITIES: no cyanosis, clubbing or edema  NEURO: Oriented times three,motor and sensory are grossly intact    ASSESSMENT AND PLAN:   Louie Rose is a 64 year old male who presents for a complete physical exam. Body mass index is 35.58 kg/m²., recommended low fat diet and aerobic exercise 30 minutes three times weekly.     Health maintenance, we reviewed the results of his fasting Lipids, CMP, CBC and PSA.     The 10-year ASCVD risk score (Astrid APARICIO, et al., 2019) is: 16.7%    Values used to calculate the score:      Age: 64 years      Sex: Male      Is Non- : No      Diabetic: No      Tobacco smoker: No      Systolic Blood Pressure: 136 mmHg      Is BP treated: Yes      HDL Cholesterol: 32 mg/dL      Total Cholesterol: 191 mg/dL    Monitor mild elevated lft. Due to elevated chol  Check CT ultrafast  Pt is up to date with screening colonoscopy.     Pt info handouts given for: exercise, low fat diet,     The patient indicates understanding of these issues and agrees to the plan.  The patient is asked to return for CPX in 12 m.    History/Other:   Review of Systems  Current Outpatient Medications   Medication Sig Dispense Refill    Tadalafil 20 MG Oral Tab Take 1 tablet (20 mg total) by mouth daily as needed for Erectile Dysfunction. 20 tablet 2    Olmesartan Medoxomil (BENICAR) 40 MG Oral Tab Take 1 tablet (40 mg total) by mouth daily. 90 tablet 1    ipratropium 0.06 % Nasal Solution 2 sprays by Nasal route 3 (three) times daily. 1 each 2    Flaxseed, Linseed, (FLAX SEED OIL OR) Take by mouth.      Multiple Vitamins-Minerals (MULTIVITAL OR) Take  by mouth.       Allergies:Allergies[1]    Objective:    Physical Exam    Assessment & Plan:   1. Annual physical exam    2. Erectile dysfunction, unspecified erectile dysfunction type    3. Benign prostatic hyperplasia, unspecified whether lower urinary tract symptoms present    4. Essential hypertension        No orders of the defined types were placed in this encounter.      Meds This Visit:  Requested Prescriptions     Signed Prescriptions Disp Refills    Tadalafil 20 MG Oral Tab 20 tablet 2     Sig: Take 1 tablet (20 mg total) by mouth daily as needed for Erectile Dysfunction.    Olmesartan Medoxomil (BENICAR) 40 MG Oral Tab 90 tablet 1     Sig: Take 1 tablet (40 mg total) by mouth daily.       Imaging & Referrals:  CT CALCIUM SCORING         [1]   Allergies  Allergen Reactions    Red Dye HIVES     CAN'T TAKE THE GENERIC BENICAR BECAUSE ALLERGIC TO THE RED DYE# 5 THAT'S IN THE GENERIC.    Vasotec [Enalapril] Coughing

## 2025-01-17 ENCOUNTER — HOSPITAL ENCOUNTER (OUTPATIENT)
Dept: CT IMAGING | Facility: HOSPITAL | Age: 65
Discharge: HOME | End: 2025-01-17
Attending: INTERNAL MEDICINE

## 2025-01-17 DIAGNOSIS — Z13.6 SCREENING FOR CARDIOVASCULAR CONDITION: ICD-10-CM

## 2025-01-17 DIAGNOSIS — K20.90 ESOPHAGITIS: Primary | ICD-10-CM

## 2025-01-17 NOTE — PROGRESS NOTES
Date of Service 1/17/2025    MEAGHAN IVY  Date of Birth 8/14/1960    Patient Age: 64 year old    PCP: Margarito Prieto MD  [address] [place] [id number]    Heart Scan Consult  Preliminary Heart Scan Score: 143    Previous Screening  Heart Scan Completed Previously: Yes  Year of last heart scan: 2015  Score of last heart scan: 95.5  Peripheral Vascular Scan Completed Previously: No          Risk Factors  Personal Risk Factors  Non-alterable Risk Factors: Personal History;Age;Gender;Family History  Alterable Risk Factors: Abnormal Cholesterol          Blood Pressure  There were no vitals taken for this visit.  (Normal =< 120/80,  Elevated = 120-129/ >80,  High Stage1 130-139/80-89 , Stage2 >140/>90)    Lipid Profile  Cholesterol: 191, done on 12/11/2024.  HDL Cholesterol: 32, done on 12/11/2024.  LDL Cholesterol: 102, done on 12/11/2024.  TriGlycerides 334, done on 12/11/2024.    Cholesterol Goals  Value   Total  =< 200   HDL  = > 45 Men = > 55 Women   LDL   =< 100   Triglycerides  =< 150       Glucose and Hemoglobin A1C  Lab Results   Component Value Date    GLU 92 12/11/2024    A1C 5.3 12/17/2022     (Normal Fasting Glucose < 100mg/dl )    Nurse Review  Risk factor information and results reviewed with Nurse: Yes    Recommended Follow Up:  Consult your physician regarding::   Final Heart Scan Report;  Discuss potential for Incidental Finding;  Discuss Potential for Score Variance;  Blood Pressure      Recommendations for Change:  Nutrition Changes: Low Saturated Fat;Low Fat Dairy;Increase Fiber    Cholesterol Modification (goal of therapy depends upon your risk):   Increase HDL (Healthy/Good) Normal >45 Men >55 Women;  Decrease LDL (Lousy/Bad) Ideal <100;  Decrease Triglycerides (Ugly) Normal <150    Exercise: Enhance Current Program         Weight Management: Decrease Current Weight         Repeat Heart Scan:   3 Years if Calcium Score is > 0.0;  Discuss with your Physician               [place] Recommended Resources:  Recommended Resources: Upcoming Classes, Medical Services and Health Library [email or web address];    PV Screening  Recommended PV Screening: Carotids;Abdomen;Ankle-Brachial Index (ANAT)      Other Resources:: Educational handouts provided.      Radha SCHILLING RN        Please Contact the Nurse Heart Line with any Questions or Concerns [phone].

## 2025-02-26 ENCOUNTER — OFFICE VISIT (OUTPATIENT)
Dept: INTERNAL MEDICINE CLINIC | Facility: CLINIC | Age: 65
End: 2025-02-26
Payer: COMMERCIAL

## 2025-02-26 VITALS
WEIGHT: 255 LBS | SYSTOLIC BLOOD PRESSURE: 135 MMHG | BODY MASS INDEX: 36.51 KG/M2 | RESPIRATION RATE: 16 BRPM | HEART RATE: 86 BPM | HEIGHT: 70 IN | OXYGEN SATURATION: 97 % | TEMPERATURE: 97 F | DIASTOLIC BLOOD PRESSURE: 80 MMHG

## 2025-02-26 DIAGNOSIS — J01.40 ACUTE NON-RECURRENT PANSINUSITIS: Primary | ICD-10-CM

## 2025-02-26 PROCEDURE — 3008F BODY MASS INDEX DOCD: CPT

## 2025-02-26 PROCEDURE — G2211 COMPLEX E/M VISIT ADD ON: HCPCS

## 2025-02-26 PROCEDURE — 3079F DIAST BP 80-89 MM HG: CPT

## 2025-02-26 PROCEDURE — 99213 OFFICE O/P EST LOW 20 MIN: CPT

## 2025-02-26 PROCEDURE — 3075F SYST BP GE 130 - 139MM HG: CPT

## 2025-02-26 RX ORDER — PREDNISONE 20 MG/1
40 TABLET ORAL DAILY
Qty: 14 TABLET | Refills: 0 | Status: SHIPPED | OUTPATIENT
Start: 2025-02-26 | End: 2025-03-05

## 2025-02-26 NOTE — PROGRESS NOTES
HPI:   Patient presents with symptoms of cough, nasal congestion, PND, bilateral ear fullness for 4 weeks.  Denies fever, body aches, chills.  Current treatment includes Mucinex with no relief.  Current Outpatient Medications   Medication Sig Dispense Refill    amoxicillin clavulanate 875-125 MG Oral Tab Take 1 tablet by mouth 2 (two) times daily for 10 days. 20 tablet 0    predniSONE 20 MG Oral Tab Take 2 tablets (40 mg total) by mouth daily for 7 days. 14 tablet 0    Tadalafil 20 MG Oral Tab Take 1 tablet (20 mg total) by mouth daily as needed for Erectile Dysfunction. 20 tablet 2    Olmesartan Medoxomil (BENICAR) 40 MG Oral Tab Take 1 tablet (40 mg total) by mouth daily. 90 tablet 1    ipratropium 0.06 % Nasal Solution 2 sprays by Nasal route 3 (three) times daily. 1 each 2    Flaxseed, Linseed, (FLAX SEED OIL OR) Take by mouth.      Multiple Vitamins-Minerals (MULTIVITAL OR) Take  by mouth.        Past Medical History:    Acute meniscal tear of left knee    9.2016    Disorder of prostate    Hemorrhoids    Hyperlipidemia    Hypertriglyceridemia    Hyperuricemia    Insulin resistance    Unspecified essential hypertension    Unspecified sinusitis (chronic)    Wears glasses      Social History:  Social History     Socioeconomic History    Marital status:    Tobacco Use    Smoking status: Never    Smokeless tobacco: Never   Vaping Use    Vaping status: Never Used   Substance and Sexual Activity    Alcohol use: Yes     Alcohol/week: 1.0 standard drink of alcohol     Types: 1 Standard drinks or equivalent per week    Drug use: No   Other Topics Concern    Caffeine Concern Yes     Comment: 3 x per week     Seat Belt No         REVIEW OF SYSTEMS:   Review of Systems   Constitutional: Negative.    HENT:  Positive for congestion, postnasal drip, rhinorrhea, sinus pressure and sinus pain.    Respiratory: Negative.     Cardiovascular: Negative.    Skin: Negative.    Neurological: Negative.    Psychiatric/Behavioral:  Negative.          EXAM:   /80   Pulse 86   Temp 97.4 °F (36.3 °C)   Resp 16   Ht 5' 10\" (1.778 m)   Wt 255 lb (115.7 kg)   SpO2 97%   BMI 36.59 kg/m²   Physical Exam  Vitals and nursing note reviewed.   Constitutional:       Appearance: Normal appearance. He is normal weight.   HENT:      Right Ear: A middle ear effusion is present. Tympanic membrane is not erythematous or bulging.      Left Ear: A middle ear effusion is present. Tympanic membrane is not erythematous or bulging.      Nose: Mucosal edema present.      Right Turbinates: Swollen.      Left Turbinates: Swollen.      Right Sinus: Maxillary sinus tenderness and frontal sinus tenderness present.      Left Sinus: Maxillary sinus tenderness and frontal sinus tenderness present.      Mouth/Throat:      Lips: Pink.      Mouth: Mucous membranes are moist.      Pharynx: Uvula midline. Postnasal drip present.      Tonsils: No tonsillar exudate or tonsillar abscesses.   Cardiovascular:      Rate and Rhythm: Normal rate and regular rhythm.      Pulses: Normal pulses.      Heart sounds: Normal heart sounds.   Pulmonary:      Effort: Pulmonary effort is normal.      Breath sounds: Normal breath sounds.   Skin:     General: Skin is warm and dry.      Capillary Refill: Capillary refill takes less than 2 seconds.   Neurological:      General: No focal deficit present.      Mental Status: He is alert and oriented to person, place, and time. Mental status is at baseline.   Psychiatric:         Mood and Affect: Mood normal.         Behavior: Behavior normal.         Thought Content: Thought content normal.         Judgment: Judgment normal.          ASSESSMENT AND PLAN:     Encounter Diagnosis   Name Primary?    Acute non-recurrent pansinusitis Yes     -complete the abc and steroid burst as prescribed    Requested Prescriptions     Signed Prescriptions Disp Refills    amoxicillin clavulanate 875-125 MG Oral Tab 20 tablet 0     Sig: Take 1 tablet by mouth 2  (two) times daily for 10 days.    predniSONE 20 MG Oral Tab 14 tablet 0     Sig: Take 2 tablets (40 mg total) by mouth daily for 7 days.       Instructions given on increasing fluid intake  The patient indicates understanding of these issues and agrees to the plan.  The patient is asked to return in 3 days if not better. Call if fever or worsening symptoms.

## 2025-06-12 ENCOUNTER — LAB ENCOUNTER (OUTPATIENT)
Dept: LAB | Age: 65
End: 2025-06-12
Attending: INTERNAL MEDICINE
Payer: COMMERCIAL

## 2025-06-12 DIAGNOSIS — Z00.00 ANNUAL PHYSICAL EXAM: ICD-10-CM

## 2025-06-12 DIAGNOSIS — Z12.5 PROSTATE CANCER SCREENING: ICD-10-CM

## 2025-06-12 DIAGNOSIS — R79.89 LOW TESTOSTERONE: ICD-10-CM

## 2025-06-12 LAB
ALBUMIN SERPL-MCNC: 4.7 G/DL (ref 3.2–4.8)
ALBUMIN/GLOB SERPL: 1.7 {RATIO} (ref 1–2)
ALP LIVER SERPL-CCNC: 91 U/L (ref 45–117)
ALT SERPL-CCNC: 51 U/L (ref 10–49)
ANION GAP SERPL CALC-SCNC: 8 MMOL/L (ref 0–18)
AST SERPL-CCNC: 37 U/L (ref ?–34)
BASOPHILS # BLD AUTO: 0.05 X10(3) UL (ref 0–0.2)
BASOPHILS NFR BLD AUTO: 0.8 %
BILIRUB SERPL-MCNC: 0.6 MG/DL (ref 0.2–1.1)
BILIRUB UR QL STRIP.AUTO: NEGATIVE
BUN BLD-MCNC: 15 MG/DL (ref 9–23)
CALCIUM BLD-MCNC: 9.1 MG/DL (ref 8.7–10.6)
CHLORIDE SERPL-SCNC: 106 MMOL/L (ref 98–112)
CHOLEST SERPL-MCNC: 183 MG/DL (ref ?–200)
CLARITY UR REFRACT.AUTO: CLEAR
CO2 SERPL-SCNC: 28 MMOL/L (ref 21–32)
COMPLEXED PSA SERPL-MCNC: 1.45 NG/ML (ref ?–4)
CREAT BLD-MCNC: 1.12 MG/DL (ref 0.7–1.3)
EGFRCR SERPLBLD CKD-EPI 2021: 74 ML/MIN/1.73M2 (ref 60–?)
EOSINOPHIL # BLD AUTO: 0.23 X10(3) UL (ref 0–0.7)
EOSINOPHIL NFR BLD AUTO: 3.8 %
ERYTHROCYTE [DISTWIDTH] IN BLOOD BY AUTOMATED COUNT: 14.1 %
FASTING PATIENT LIPID ANSWER: YES
FASTING STATUS PATIENT QL REPORTED: YES
GLOBULIN PLAS-MCNC: 2.8 G/DL (ref 2–3.5)
GLUCOSE BLD-MCNC: 101 MG/DL (ref 70–99)
GLUCOSE UR STRIP.AUTO-MCNC: NORMAL MG/DL
HCT VFR BLD AUTO: 45.1 % (ref 39–53)
HDLC SERPL-MCNC: 29 MG/DL (ref 40–59)
HGB BLD-MCNC: 15.4 G/DL (ref 13–17.5)
IMM GRANULOCYTES # BLD AUTO: 0.03 X10(3) UL (ref 0–1)
IMM GRANULOCYTES NFR BLD: 0.5 %
KETONES UR STRIP.AUTO-MCNC: NEGATIVE MG/DL
LDLC SERPL CALC-MCNC: 101 MG/DL (ref ?–100)
LEUKOCYTE ESTERASE UR QL STRIP.AUTO: NEGATIVE
LYMPHOCYTES # BLD AUTO: 1.84 X10(3) UL (ref 1–4)
LYMPHOCYTES NFR BLD AUTO: 30.4 %
MCH RBC QN AUTO: 29.1 PG (ref 26–34)
MCHC RBC AUTO-ENTMCNC: 34.1 G/DL (ref 31–37)
MCV RBC AUTO: 85.1 FL (ref 80–100)
MONOCYTES # BLD AUTO: 0.59 X10(3) UL (ref 0.1–1)
MONOCYTES NFR BLD AUTO: 9.8 %
NEUTROPHILS # BLD AUTO: 3.31 X10 (3) UL (ref 1.5–7.7)
NEUTROPHILS # BLD AUTO: 3.31 X10(3) UL (ref 1.5–7.7)
NEUTROPHILS NFR BLD AUTO: 54.7 %
NITRITE UR QL STRIP.AUTO: NEGATIVE
NONHDLC SERPL-MCNC: 154 MG/DL (ref ?–130)
OSMOLALITY SERPL CALC.SUM OF ELEC: 295 MOSM/KG (ref 275–295)
PH UR STRIP.AUTO: 5 [PH] (ref 5–8)
PLATELET # BLD AUTO: 206 10(3)UL (ref 150–450)
POTASSIUM SERPL-SCNC: 4.7 MMOL/L (ref 3.5–5.1)
PROT SERPL-MCNC: 7.5 G/DL (ref 5.7–8.2)
PROT UR STRIP.AUTO-MCNC: NEGATIVE MG/DL
RBC # BLD AUTO: 5.3 X10(6)UL (ref 4.3–5.7)
RBC UR QL AUTO: NEGATIVE
SODIUM SERPL-SCNC: 142 MMOL/L (ref 136–145)
SP GR UR STRIP.AUTO: 1.02 (ref 1–1.03)
TRIGL SERPL-MCNC: 314 MG/DL (ref 30–149)
TSI SER-ACNC: 4.37 UIU/ML (ref 0.55–4.78)
UROBILINOGEN UR STRIP.AUTO-MCNC: NORMAL MG/DL
VLDLC SERPL CALC-MCNC: 53 MG/DL (ref 0–30)
WBC # BLD AUTO: 6.1 X10(3) UL (ref 4–11)

## 2025-06-12 PROCEDURE — 84443 ASSAY THYROID STIM HORMONE: CPT

## 2025-06-12 PROCEDURE — 84402 ASSAY OF FREE TESTOSTERONE: CPT

## 2025-06-12 PROCEDURE — 81003 URINALYSIS AUTO W/O SCOPE: CPT

## 2025-06-12 PROCEDURE — 80053 COMPREHEN METABOLIC PANEL: CPT

## 2025-06-12 PROCEDURE — 36415 COLL VENOUS BLD VENIPUNCTURE: CPT

## 2025-06-12 PROCEDURE — 84403 ASSAY OF TOTAL TESTOSTERONE: CPT

## 2025-06-12 PROCEDURE — 80061 LIPID PANEL: CPT

## 2025-06-12 PROCEDURE — 85025 COMPLETE CBC W/AUTO DIFF WBC: CPT

## 2025-06-15 LAB
FREE TESTOST DIRECT: 9 PG/ML
TESTOSTERONE: 300 NG/DL

## 2025-06-16 ENCOUNTER — OFFICE VISIT (OUTPATIENT)
Dept: INTERNAL MEDICINE CLINIC | Facility: CLINIC | Age: 65
End: 2025-06-16
Payer: COMMERCIAL

## 2025-06-16 VITALS
HEIGHT: 70 IN | TEMPERATURE: 98 F | HEART RATE: 81 BPM | SYSTOLIC BLOOD PRESSURE: 155 MMHG | BODY MASS INDEX: 36.65 KG/M2 | OXYGEN SATURATION: 98 % | DIASTOLIC BLOOD PRESSURE: 90 MMHG | RESPIRATION RATE: 16 BRPM | WEIGHT: 256 LBS

## 2025-06-16 DIAGNOSIS — I10 ESSENTIAL HYPERTENSION: Primary | ICD-10-CM

## 2025-06-16 DIAGNOSIS — Z23 NEED FOR PNEUMOCOCCAL VACCINATION: ICD-10-CM

## 2025-06-16 DIAGNOSIS — Z91.89 FRAMINGHAM CARDIAC RISK >20% IN NEXT 10 YEARS: ICD-10-CM

## 2025-06-16 PROCEDURE — 90471 IMMUNIZATION ADMIN: CPT | Performed by: INTERNAL MEDICINE

## 2025-06-16 PROCEDURE — 99213 OFFICE O/P EST LOW 20 MIN: CPT | Performed by: INTERNAL MEDICINE

## 2025-06-16 PROCEDURE — 3077F SYST BP >= 140 MM HG: CPT | Performed by: INTERNAL MEDICINE

## 2025-06-16 PROCEDURE — 3080F DIAST BP >= 90 MM HG: CPT | Performed by: INTERNAL MEDICINE

## 2025-06-16 PROCEDURE — 3008F BODY MASS INDEX DOCD: CPT | Performed by: INTERNAL MEDICINE

## 2025-06-16 PROCEDURE — 90677 PCV20 VACCINE IM: CPT | Performed by: INTERNAL MEDICINE

## 2025-06-16 PROCEDURE — G2211 COMPLEX E/M VISIT ADD ON: HCPCS | Performed by: INTERNAL MEDICINE

## 2025-06-16 RX ORDER — AMLODIPINE BESYLATE 5 MG/1
5 TABLET ORAL DAILY
Qty: 90 TABLET | Refills: 1 | Status: SHIPPED | OUTPATIENT
Start: 2025-06-16

## 2025-06-16 NOTE — PROGRESS NOTES
Subjective:   Patient ID: Louie Rose is a 64 year old male.  The 10-year ASCVD risk score (Astrid APARICIO, et al., 2019) is: 21.2%    Values used to calculate the score:      Age: 64 years      Sex: Male      Is Non- : No      Diabetic: No      Tobacco smoker: No      Systolic Blood Pressure: 155 mmHg      Is BP treated: Yes      HDL Cholesterol: 29 mg/dL      Total Cholesterol: 183 mg/dL    HPI  Louie Rose is a 64 year old male.     HPI:   Patient presents for recheck of his hypertension. Pt has been taking medications as instructed, no medication side effects, home BP monitoring in the range of 130's systolic and 70's diastolic.  Denies cp or sob    Wt Readings from Last 6 Encounters:   06/16/25 256 lb (116.1 kg)   02/26/25 255 lb (115.7 kg)   12/16/24 248 lb (112.5 kg)   08/21/24 252 lb (114.3 kg)   04/24/24 256 lb (116.1 kg)   03/20/24 252 lb (114.3 kg)     Body mass index is 36.73 kg/m².    Lab Results   Component Value Date    CHOLEST 183 06/12/2025    CHOLEST 191 12/11/2024    CHOLEST 220 (H) 12/08/2023     Lab Results   Component Value Date    HDL 29 (L) 06/12/2025    HDL 32 (L) 12/11/2024    HDL 38 (L) 12/08/2023     Lab Results   Component Value Date    TRIG 314 (H) 06/12/2025    TRIG 334 (H) 12/11/2024    TRIG 185 (H) 12/08/2023    TRIGLY 197 02/22/2018     Lab Results   Component Value Date     (H) 06/12/2025     (H) 12/11/2024     (H) 12/08/2023     Lab Results   Component Value Date    AST 37 (H) 06/12/2025    AST 42 (H) 12/11/2024    AST 20 12/08/2023     Lab Results   Component Value Date    ALT 51 (H) 06/12/2025    ALT 63 (H) 12/11/2024    ALT 42 12/08/2023     Lab Results   Component Value Date     (H) 06/12/2025    GLU 92 12/11/2024    GLU 98 12/08/2023       Current Medications[1]   Past Medical History[2]   Past Surgical History[3]   Social History:    Short Social Hx on File[4]      REVIEW OF SYSTEMS:   GENERAL HEALTH: feels well  otherwise  SKIN: denies any unusual skin lesions or rashes  RESPIRATORY: denies shortness of breath with exertion  CARDIOVASCULAR: denies chest pain on exertion  GI: denies abdominal pain and denies heartburn  NEURO: denies headaches    EXAM:   /90   Pulse 81   Temp 98.2 °F (36.8 °C)   Resp 16   Ht 5' 10\" (1.778 m)   Wt 256 lb (116.1 kg)   SpO2 98%   BMI 36.73 kg/m²   GENERAL: well developed, well nourished,in no apparent distress  SKIN: no rashes,no suspicious lesions  HEENT: atraumatic, normocephalic,ears and throat are clear  NECK: supple,no adenopathy,no bruits  LUNGS: clear to auscultation  CARDIO: RRR without murmur  GI: good BS's,no masses, HSM or tenderness  EXTREMITIES: no cyanosis, clubbing or edema    ASSESSMENT AND PLAN:   Pt presents for a recheck of his hypertension. BP is asymptomatic, no significant medication side effects noted, poorly controlled.  PLAN: will continue present medications, reviewed diet, exercise and weight control, he declines statin use still.add norvasc 5 mg daily  10 year risk for CAD reviewed with pt. Declines statin   The patient indicates understanding of these issues and agrees to the plan.  The patient is asked to return in 2 weeks    History/Other:   Review of Systems  Current Medications[5]  Allergies:Allergies[6]    Objective:   Physical Exam    Assessment & Plan:   1. Essential hypertension    2. Need for pneumococcal vaccination    3. [place] cardiac risk >20% in next 10 years        Orders Placed This Encounter   Procedures    Prevnar 20 (PCV20) [73087]       Meds This Visit:  Requested Prescriptions     Signed Prescriptions Disp Refills    amLODIPine 5 MG Oral Tab 90 tablet 1     Sig: Take 1 tablet (5 mg total) by mouth daily.       Imaging & Referrals:  PCV20 VACCINE FOR INTRAMUSCULAR USE         [1]   Current Outpatient Medications   Medication Sig Dispense Refill    amLODIPine 5 MG Oral Tab Take 1 tablet (5 mg total) by mouth daily. 90 tablet 1     Tadalafil 20 MG Oral Tab Take 1 tablet (20 mg total) by mouth daily as needed for Erectile Dysfunction. 20 tablet 2    Olmesartan Medoxomil (BENICAR) 40 MG Oral Tab Take 1 tablet (40 mg total) by mouth daily. 90 tablet 1    ipratropium 0.06 % Nasal Solution 2 sprays by Nasal route 3 (three) times daily. 1 each 2    Flaxseed, Linseed, (FLAX SEED OIL OR) Take by mouth.      Multiple Vitamins-Minerals (MULTIVITAL OR) Take  by mouth.     [2]   Past Medical History:   Acute meniscal tear of left knee    9.2016    Disorder of prostate    Hemorrhoids    Hyperlipidemia    Hypertriglyceridemia    Hyperuricemia    Insulin resistance    Unspecified essential hypertension    Unspecified sinusitis (chronic)    Wears glasses   [3]   Past Surgical History:  Procedure Laterality Date    Colonoscopy  2007    Colonoscopy  2013    Other surgical history      Tip left hand middle finger. Amputation of middle finger , With Neurectomy   [4]   Social History  Socioeconomic History    Marital status:    Tobacco Use    Smoking status: Never    Smokeless tobacco: Never   Vaping Use    Vaping status: Never Used   Substance and Sexual Activity    Alcohol use: Yes     Alcohol/week: 1.0 standard drink of alcohol     Types: 1 Standard drinks or equivalent per week    Drug use: No   Other Topics Concern    Caffeine Concern Yes     Comment: 3 x per week     Seat Belt No     Social Drivers of Health     Food Insecurity: Unknown (6/16/2025)    NCSS - Food Insecurity     Ran Out of Food in the Last Year: No   Transportation Needs: No Transportation Needs (6/16/2025)    NCSS - Transportation     Lack of Transportation: No   Housing Stability: Not At Risk (6/16/2025)    NCSS - Housing/Utilities     Has Housing: Yes     Worried About Losing Housing: No     Unable to Get Utilities: No   [5]   Current Outpatient Medications   Medication Sig Dispense Refill    amLODIPine 5 MG Oral Tab Take 1 tablet (5 mg total) by mouth daily. 90 tablet 1     Tadalafil 20 MG Oral Tab Take 1 tablet (20 mg total) by mouth daily as needed for Erectile Dysfunction. 20 tablet 2    Olmesartan Medoxomil (BENICAR) 40 MG Oral Tab Take 1 tablet (40 mg total) by mouth daily. 90 tablet 1    ipratropium 0.06 % Nasal Solution 2 sprays by Nasal route 3 (three) times daily. 1 each 2    Flaxseed, Linseed, (FLAX SEED OIL OR) Take by mouth.      Multiple Vitamins-Minerals (MULTIVITAL OR) Take  by mouth.     [6]   Allergies  Allergen Reactions    Red Dye HIVES     CAN'T TAKE THE GENERIC BENICAR BECAUSE ALLERGIC TO THE RED DYE# 5 THAT'S IN THE GENERIC.    Vasotec [Enalapril] Coughing

## 2025-06-29 DIAGNOSIS — I10 ESSENTIAL HYPERTENSION: ICD-10-CM

## 2025-06-30 ENCOUNTER — OFFICE VISIT (OUTPATIENT)
Dept: INTERNAL MEDICINE CLINIC | Facility: CLINIC | Age: 65
End: 2025-06-30
Payer: COMMERCIAL

## 2025-06-30 VITALS
BODY MASS INDEX: 37.51 KG/M2 | DIASTOLIC BLOOD PRESSURE: 80 MMHG | HEART RATE: 78 BPM | RESPIRATION RATE: 16 BRPM | OXYGEN SATURATION: 98 % | WEIGHT: 262 LBS | HEIGHT: 70 IN | SYSTOLIC BLOOD PRESSURE: 145 MMHG

## 2025-06-30 DIAGNOSIS — I10 ESSENTIAL HYPERTENSION: Primary | ICD-10-CM

## 2025-06-30 PROCEDURE — 99213 OFFICE O/P EST LOW 20 MIN: CPT | Performed by: INTERNAL MEDICINE

## 2025-06-30 PROCEDURE — 3077F SYST BP >= 140 MM HG: CPT | Performed by: INTERNAL MEDICINE

## 2025-06-30 PROCEDURE — 3079F DIAST BP 80-89 MM HG: CPT | Performed by: INTERNAL MEDICINE

## 2025-06-30 PROCEDURE — G2211 COMPLEX E/M VISIT ADD ON: HCPCS | Performed by: INTERNAL MEDICINE

## 2025-06-30 PROCEDURE — 3008F BODY MASS INDEX DOCD: CPT | Performed by: INTERNAL MEDICINE

## 2025-06-30 RX ORDER — AMLODIPINE BESYLATE 10 MG/1
10 TABLET ORAL DAILY
COMMUNITY
Start: 2025-06-30

## 2025-06-30 RX ORDER — OLMESARTAN MEDOXOMIL 40 MG/1
40 TABLET, FILM COATED ORAL DAILY
Qty: 90 TABLET | Refills: 0 | Status: SHIPPED | OUTPATIENT
Start: 2025-06-30

## 2025-06-30 NOTE — PROGRESS NOTES
Subjective:   Patient ID: Louie Rose is a 64 year old male.    Hypertension      Louie Rose is a 64 year old male.     HPI:   Patient presents for recheck of his hypertension. Pt has been taking medications as instructed, no medication side effects, home BP monitoring in the range of 145's systolic and 70's diastolic.    Wt Readings from Last 6 Encounters:   06/30/25 262 lb (118.8 kg)   06/16/25 256 lb (116.1 kg)   02/26/25 255 lb (115.7 kg)   12/16/24 248 lb (112.5 kg)   08/21/24 252 lb (114.3 kg)   04/24/24 256 lb (116.1 kg)     Body mass index is 37.59 kg/m².    Lab Results   Component Value Date    CHOLEST 183 06/12/2025    CHOLEST 191 12/11/2024    CHOLEST 220 (H) 12/08/2023     Lab Results   Component Value Date    HDL 29 (L) 06/12/2025    HDL 32 (L) 12/11/2024    HDL 38 (L) 12/08/2023     Lab Results   Component Value Date    TRIG 314 (H) 06/12/2025    TRIG 334 (H) 12/11/2024    TRIG 185 (H) 12/08/2023    TRIGLY 197 02/22/2018     Lab Results   Component Value Date     (H) 06/12/2025     (H) 12/11/2024     (H) 12/08/2023     Lab Results   Component Value Date    AST 37 (H) 06/12/2025    AST 42 (H) 12/11/2024    AST 20 12/08/2023     Lab Results   Component Value Date    ALT 51 (H) 06/12/2025    ALT 63 (H) 12/11/2024    ALT 42 12/08/2023     Lab Results   Component Value Date     (H) 06/12/2025    GLU 92 12/11/2024    GLU 98 12/08/2023       Current Medications[1]   Past Medical History[2]   Past Surgical History[3]   Social History:    Short Social Hx on File[4]      REVIEW OF SYSTEMS:   GENERAL HEALTH: feels well otherwise  SKIN: denies any unusual skin lesions or rashes  RESPIRATORY: denies shortness of breath with exertion  CARDIOVASCULAR: denies chest pain on exertion  GI: denies abdominal pain and denies heartburn  NEURO: denies headaches    EXAM:   /80   Pulse 78   Resp 16   Ht 5' 10\" (1.778 m)   Wt 262 lb (118.8 kg)   SpO2 98%   BMI 37.59 kg/m²   GENERAL:  well developed, well nourished,in no apparent distress  SKIN: no rashes,no suspicious lesions  HEENT: atraumatic, normocephalic,ears and throat are clear  NECK: supple,no adenopathy,no bruits  LUNGS: clear to auscultation  CARDIO: RRR without murmur  GI: good BS's,no masses, HSM or tenderness  EXTREMITIES: no cyanosis, clubbing or edema    ASSESSMENT AND PLAN:   Pt presents for a recheck of his hypertension. BP is asymptomatic, no significant medication side effects noted, borderline controlled.  PLAN: will continue present medications, increase norvasc to 10 mg daily. The patient indicates understanding of these issues and agrees to the plan.  The patient is asked to return in 2 weeks.    History/Other:   Review of Systems  Current Medications[5]  Allergies:Allergies[6]    Objective:   Physical Exam    Assessment & Plan:   1. Essential hypertension        No orders of the defined types were placed in this encounter.      Meds This Visit:  Requested Prescriptions      No prescriptions requested or ordered in this encounter       Imaging & Referrals:  None         [1]   Current Outpatient Medications   Medication Sig Dispense Refill    amLODIPine (NORVASC) 10 MG Oral Tab Take 1 tablet (10 mg total) by mouth daily.      Tadalafil 20 MG Oral Tab Take 1 tablet (20 mg total) by mouth daily as needed for Erectile Dysfunction. 20 tablet 2    Olmesartan Medoxomil (BENICAR) 40 MG Oral Tab Take 1 tablet (40 mg total) by mouth daily. 90 tablet 1    ipratropium 0.06 % Nasal Solution 2 sprays by Nasal route 3 (three) times daily. 1 each 2    Flaxseed, Linseed, (FLAX SEED OIL OR) Take by mouth.      Multiple Vitamins-Minerals (MULTIVITAL OR) Take  by mouth.     [2]   Past Medical History:   Acute meniscal tear of left knee    9.2016    Disorder of prostate    Hemorrhoids    Hyperlipidemia    Hypertriglyceridemia    Hyperuricemia    Insulin resistance    Unspecified essential hypertension    Unspecified sinusitis (chronic)     Wears glasses   [3]   Past Surgical History:  Procedure Laterality Date    Colonoscopy  2007    Colonoscopy  2013    Other surgical history      Tip left hand middle finger. Amputation of middle finger , With Neurectomy   [4]   Social History  Socioeconomic History    Marital status:    Tobacco Use    Smoking status: Never    Smokeless tobacco: Never   Vaping Use    Vaping status: Never Used   Substance and Sexual Activity    Alcohol use: Yes     Alcohol/week: 1.0 standard drink of alcohol     Types: 1 Standard drinks or equivalent per week    Drug use: No   Other Topics Concern    Caffeine Concern Yes     Comment: 3 x per week     Seat Belt No     Social Drivers of Health     Food Insecurity: Unknown (6/16/2025)    NCSS - Food Insecurity     Ran Out of Food in the Last Year: No   Transportation Needs: No Transportation Needs (6/16/2025)    NCSS - Transportation     Lack of Transportation: No   Housing Stability: Not At Risk (6/16/2025)    NCSS - Housing/Utilities     Has Housing: Yes     Worried About Losing Housing: No     Unable to Get Utilities: No   [5]   Current Outpatient Medications   Medication Sig Dispense Refill    amLODIPine (NORVASC) 10 MG Oral Tab Take 1 tablet (10 mg total) by mouth daily.      Tadalafil 20 MG Oral Tab Take 1 tablet (20 mg total) by mouth daily as needed for Erectile Dysfunction. 20 tablet 2    Olmesartan Medoxomil (BENICAR) 40 MG Oral Tab Take 1 tablet (40 mg total) by mouth daily. 90 tablet 1    ipratropium 0.06 % Nasal Solution 2 sprays by Nasal route 3 (three) times daily. 1 each 2    Flaxseed, Linseed, (FLAX SEED OIL OR) Take by mouth.      Multiple Vitamins-Minerals (MULTIVITAL OR) Take  by mouth.     [6]   Allergies  Allergen Reactions    Red Dye HIVES     CAN'T TAKE THE GENERIC BENICAR BECAUSE ALLERGIC TO THE RED DYE# 5 THAT'S IN THE GENERIC.    Vasotec [Enalapril] Coughing

## 2025-06-30 NOTE — TELEPHONE ENCOUNTER
Last time medication was refilled 12/16/24  Last office visit  6/30/25  Next office visit due/scheduled   Future Appointments   Date Time Provider Department Center   7/14/2025  9:15 AM Margarito Prieto MD [facility]   12/16/2025 10:00 AM Margarito Prieto MD [facility]       Medication failed protocol

## 2025-07-14 ENCOUNTER — OFFICE VISIT (OUTPATIENT)
Dept: INTERNAL MEDICINE CLINIC | Facility: CLINIC | Age: 65
End: 2025-07-14
Payer: COMMERCIAL

## 2025-07-14 VITALS
WEIGHT: 257 LBS | TEMPERATURE: 98 F | OXYGEN SATURATION: 98 % | RESPIRATION RATE: 16 BRPM | DIASTOLIC BLOOD PRESSURE: 72 MMHG | SYSTOLIC BLOOD PRESSURE: 138 MMHG | HEIGHT: 70 IN | HEART RATE: 73 BPM | BODY MASS INDEX: 36.79 KG/M2

## 2025-07-14 DIAGNOSIS — N40.0 BENIGN PROSTATIC HYPERPLASIA, UNSPECIFIED WHETHER LOWER URINARY TRACT SYMPTOMS PRESENT: ICD-10-CM

## 2025-07-14 DIAGNOSIS — Z00.00 ROUTINE GENERAL MEDICAL EXAMINATION AT A HEALTH CARE FACILITY: ICD-10-CM

## 2025-07-14 DIAGNOSIS — Z12.5 PROSTATE CANCER SCREENING: ICD-10-CM

## 2025-07-14 DIAGNOSIS — N52.9 ERECTILE DYSFUNCTION, UNSPECIFIED ERECTILE DYSFUNCTION TYPE: ICD-10-CM

## 2025-07-14 DIAGNOSIS — I10 ESSENTIAL HYPERTENSION: Primary | ICD-10-CM

## 2025-07-14 RX ORDER — TADALAFIL 20 MG/1
TABLET ORAL
Qty: 20 TABLET | Refills: 2 | Status: SHIPPED | OUTPATIENT
Start: 2025-07-14

## 2025-07-14 NOTE — PROGRESS NOTES
Subjective:   Patient ID: Louie Rose is a 64 year old male.    Hypertension      Louie Rose is a 64 year old male.     HPI:   Patient presents for recheck of his hypertension. Pt has been taking medications as instructed, no medication side effects, home BP monitoring in the range of 130's systolic and 70's diastolic.  Denies cp or sob  Wt Readings from Last 6 Encounters:   07/14/25 257 lb (116.6 kg)   06/30/25 262 lb (118.8 kg)   06/16/25 256 lb (116.1 kg)   02/26/25 255 lb (115.7 kg)   12/16/24 248 lb (112.5 kg)   08/21/24 252 lb (114.3 kg)     Body mass index is 36.88 kg/m².    Lab Results   Component Value Date    CHOLEST 183 06/12/2025    CHOLEST 191 12/11/2024    CHOLEST 220 (H) 12/08/2023     Lab Results   Component Value Date    HDL 29 (L) 06/12/2025    HDL 32 (L) 12/11/2024    HDL 38 (L) 12/08/2023     Lab Results   Component Value Date    TRIG 314 (H) 06/12/2025    TRIG 334 (H) 12/11/2024    TRIG 185 (H) 12/08/2023    TRIGLY 197 02/22/2018     Lab Results   Component Value Date     (H) 06/12/2025     (H) 12/11/2024     (H) 12/08/2023     Lab Results   Component Value Date    AST 37 (H) 06/12/2025    AST 42 (H) 12/11/2024    AST 20 12/08/2023     Lab Results   Component Value Date    ALT 51 (H) 06/12/2025    ALT 63 (H) 12/11/2024    ALT 42 12/08/2023     Lab Results   Component Value Date     (H) 06/12/2025    GLU 92 12/11/2024    GLU 98 12/08/2023       Current Medications[1]   Past Medical History[2]   Past Surgical History[3]   Social History:    Short Social Hx on File[4]      REVIEW OF SYSTEMS:   GENERAL HEALTH: feels well otherwise  SKIN: denies any unusual skin lesions or rashes  RESPIRATORY: denies shortness of breath with exertion  CARDIOVASCULAR: denies chest pain on exertion  GI: denies abdominal pain and denies heartburn  NEURO: denies headaches    EXAM:   /72   Pulse 73   Temp 98.1 °F (36.7 °C)   Resp 16   Ht 5' 10\" (1.778 m)   Wt 257 lb (116.6 kg)    SpO2 98%   BMI 36.88 kg/m²   GENERAL: well developed, well nourished,in no apparent distress  SKIN: no rashes,no suspicious lesions  HEENT: atraumatic, normocephalic,ears and throat are clear  NECK: supple,no adenopathy,no bruits  LUNGS: clear to auscultation  CARDIO: RRR without murmur  GI: good BS's,no masses, HSM or tenderness  EXTREMITIES: no cyanosis, clubbing or edema    ASSESSMENT AND PLAN:   Pt presents for a recheck of his hypertension. BP is well controlled, no significant medication side effects noted.  PLAN: will continue present medications, reviewed diet, exercise and weight control. The patient indicates understanding of these issues and agrees to the plan.  The patient is asked to return in 6 m.    History/Other:   Review of Systems  Current Medications[5]  Allergies:Allergies[6]    Objective:   Physical Exam    Assessment & Plan:   No diagnosis found.    No orders of the defined types were placed in this encounter.      Meds This Visit:  Requested Prescriptions      No prescriptions requested or ordered in this encounter       Imaging & Referrals:  None         [1]   Current Outpatient Medications   Medication Sig Dispense Refill    BENICAR 40 MG Oral Tab TAKE 1 TABLET BY MOUTH EVERY DAY 90 tablet 0    amLODIPine (NORVASC) 10 MG Oral Tab Take 1 tablet (10 mg total) by mouth daily.      Tadalafil 20 MG Oral Tab Take 1 tablet (20 mg total) by mouth daily as needed for Erectile Dysfunction. 20 tablet 2    ipratropium 0.06 % Nasal Solution 2 sprays by Nasal route 3 (three) times daily. 1 each 2    Flaxseed, Linseed, (FLAX SEED OIL OR) Take by mouth.      Multiple Vitamins-Minerals (MULTIVITAL OR) Take  by mouth.     [2]   Past Medical History:   Acute meniscal tear of left knee    9.2016    Disorder of prostate    Hemorrhoids    Hyperlipidemia    Hypertriglyceridemia    Hyperuricemia    Insulin resistance    Unspecified essential hypertension    Unspecified sinusitis (chronic)    Wears glasses   [3]    Past Surgical History:  Procedure Laterality Date    Colonoscopy  2007    Colonoscopy  2013    Other surgical history      Tip left hand middle finger. Amputation of middle finger , With Neurectomy   [4]   Social History  Socioeconomic History    Marital status:    Tobacco Use    Smoking status: Never    Smokeless tobacco: Never   Vaping Use    Vaping status: Never Used   Substance and Sexual Activity    Alcohol use: Yes     Alcohol/week: 1.0 standard drink of alcohol     Types: 1 Standard drinks or equivalent per week    Drug use: No   Other Topics Concern    Caffeine Concern Yes     Comment: 3 x per week     Seat Belt No     Social Drivers of Health     Food Insecurity: Unknown (6/16/2025)    NCSS - Food Insecurity     Ran Out of Food in the Last Year: No   Transportation Needs: No Transportation Needs (6/16/2025)    NCSS - Transportation     Lack of Transportation: No   Housing Stability: Not At Risk (6/16/2025)    NCSS - Housing/Utilities     Has Housing: Yes     Worried About Losing Housing: No     Unable to Get Utilities: No   [5]   Current Outpatient Medications   Medication Sig Dispense Refill    BENICAR 40 MG Oral Tab TAKE 1 TABLET BY MOUTH EVERY DAY 90 tablet 0    amLODIPine (NORVASC) 10 MG Oral Tab Take 1 tablet (10 mg total) by mouth daily.      Tadalafil 20 MG Oral Tab Take 1 tablet (20 mg total) by mouth daily as needed for Erectile Dysfunction. 20 tablet 2    ipratropium 0.06 % Nasal Solution 2 sprays by Nasal route 3 (three) times daily. 1 each 2    Flaxseed, Linseed, (FLAX SEED OIL OR) Take by mouth.      Multiple Vitamins-Minerals (MULTIVITAL OR) Take  by mouth.     [6]   Allergies  Allergen Reactions    Red Dye HIVES     CAN'T TAKE THE GENERIC BENICAR BECAUSE ALLERGIC TO THE RED DYE# 5 THAT'S IN THE GENERIC.    Vasotec [Enalapril] Coughing

## 2025-08-12 DIAGNOSIS — I10 ESSENTIAL HYPERTENSION: ICD-10-CM

## 2025-08-12 RX ORDER — AMLODIPINE BESYLATE 10 MG/1
10 TABLET ORAL DAILY
Qty: 90 TABLET | Refills: 0 | Status: SHIPPED | OUTPATIENT
Start: 2025-08-12

## 2025-08-12 RX ORDER — OLMESARTAN MEDOXOMIL 40 MG/1
40 TABLET, FILM COATED ORAL DAILY
Qty: 90 TABLET | Refills: 0 | Status: SHIPPED | OUTPATIENT
Start: 2025-08-12

## (undated) NOTE — MR AVS SNAPSHOT
After Visit Summary   3/23/2017    Jorge Rose    MRN: [id number]           Visit Information        Provider Department Dept Phone    3/23/2017  3:00 PM [address] [facility] [phone]      Allergies as of 3/23/2017  Reviewed on:

## (undated) NOTE — LETTER
12/04/17        Gonzales Rose  [address] [facility] [address] [facility] [id number]      Dear Jeremiah Crow records indicate that you have outstanding lab work and or testing that was ordered for you and has not yet been completed:          Comp Metabolic Panel (

## (undated) NOTE — LETTER
Date: 2/21/2020    Patient Name: Renetta Mehta. To Whom it may concern: This letter has been written at the patient's request. The above patient was seen at the [facility] for treatment of a medical condition.     This patient

## (undated) NOTE — LETTER
22          [facility]    Patient: Jenny Browne   : 1960  Member ID: [id number]  DX: Essential Hypertension ICD-[id number]      Filing Appeal for [place] denial for medication Benicar 20 mg tablet. Patient has tried Losartan and Valsartan in past, medications discontinued and alternate therapy initiated. Patient was prescribed Olmesartan from 2979-2131 and discontinued as patient developed allergy to RED DYE #5 which is in this medication. Patient then started on name brand Benicar. Patient has tolerated this medication and has had adequate blood pressure control on this medication.

## (undated) NOTE — LETTER
Date: 10/4/2021    Patient Name: Cristy Street  : 1960    To Whom it may concern: The above patient was seen at the [facility] for treatment of a medical condition.     This patient should be excused from attending work on October